# Patient Record
Sex: FEMALE | Race: WHITE | Employment: OTHER | URBAN - METROPOLITAN AREA
[De-identification: names, ages, dates, MRNs, and addresses within clinical notes are randomized per-mention and may not be internally consistent; named-entity substitution may affect disease eponyms.]

---

## 2017-07-19 ENCOUNTER — HOSPITAL ENCOUNTER (OUTPATIENT)
Dept: GENERAL RADIOLOGY | Age: 77
Discharge: HOME | End: 2017-07-19
Attending: FAMILY MEDICINE | Admitting: FAMILY MEDICINE

## 2017-07-19 LAB
ALBUMIN SERPL-MCNC: 4.4 GM/DL (ref 3.4–5)
ALP BLD-CCNC: 66 IU/L (ref 40–128)
ALT SERPL-CCNC: 23 U/L (ref 10–40)
ANION GAP SERPL CALCULATED.3IONS-SCNC: 11 MMOL/L (ref 4–16)
AST SERPL-CCNC: 32 IU/L (ref 15–37)
BILIRUB SERPL-MCNC: 0.4 MG/DL (ref 0–1)
BUN BLDV-MCNC: 12 MG/DL (ref 6–23)
CALCIUM SERPL-MCNC: 9.7 MG/DL (ref 8.3–10.6)
CHLORIDE BLD-SCNC: 105 MMOL/L (ref 99–110)
CHOLESTEROL: 207 MG/DL
CO2: 28 MMOL/L (ref 21–32)
CREAT SERPL-MCNC: 0.9 MG/DL (ref 0.6–1.1)
GFR AFRICAN AMERICAN: >60 ML/MIN/1.73M2
GFR NON-AFRICAN AMERICAN: >60 ML/MIN/1.73M2
GLUCOSE BLD-MCNC: 95 MG/DL (ref 70–140)
HDLC SERPL-MCNC: 54 MG/DL
LDL CHOLESTEROL DIRECT: 140 MG/DL
POTASSIUM SERPL-SCNC: 4.9 MMOL/L (ref 3.5–5.1)
SODIUM BLD-SCNC: 144 MMOL/L (ref 135–145)
TOTAL PROTEIN: 6.5 GM/DL (ref 6.4–8.2)
TRIGL SERPL-MCNC: 210 MG/DL

## 2017-12-05 ENCOUNTER — HOSPITAL ENCOUNTER (OUTPATIENT)
Dept: GENERAL RADIOLOGY | Age: 77
Discharge: HOME | End: 2017-12-05
Attending: FAMILY MEDICINE | Admitting: FAMILY MEDICINE

## 2017-12-05 LAB
ALBUMIN SERPL-MCNC: 4 GM/DL (ref 3.4–5)
ALP BLD-CCNC: 58 IU/L (ref 40–128)
ALT SERPL-CCNC: 20 U/L (ref 10–40)
ANION GAP SERPL CALCULATED.3IONS-SCNC: 12 MMOL/L (ref 4–16)
AST SERPL-CCNC: 30 IU/L (ref 15–37)
BILIRUB SERPL-MCNC: 0.6 MG/DL (ref 0–1)
BUN BLDV-MCNC: 14 MG/DL (ref 6–23)
CALCIUM SERPL-MCNC: 9.4 MG/DL (ref 8.3–10.6)
CHLORIDE BLD-SCNC: 100 MMOL/L (ref 99–110)
CHOLESTEROL: 206 MG/DL
CO2: 27 MMOL/L (ref 21–32)
CREAT SERPL-MCNC: 0.9 MG/DL (ref 0.6–1.1)
ESTIMATED AVERAGE GLUCOSE: 114 MG/DL
GFR AFRICAN AMERICAN: >60 ML/MIN/1.73M2
GFR NON-AFRICAN AMERICAN: >60 ML/MIN/1.73M2
GLUCOSE FASTING: 93 MG/DL (ref 70–99)
HBA1C MFR BLD: 5.6 % (ref 4.2–6.3)
HDLC SERPL-MCNC: 48 MG/DL
LDL CHOLESTEROL DIRECT: 128 MG/DL
POTASSIUM SERPL-SCNC: 4.2 MMOL/L (ref 3.5–5.1)
SODIUM BLD-SCNC: 139 MMOL/L (ref 135–145)
TOTAL PROTEIN: 6.2 GM/DL (ref 6.4–8.2)
TRIGL SERPL-MCNC: 225 MG/DL

## 2017-12-22 ENCOUNTER — HOSPITAL ENCOUNTER (OUTPATIENT)
Dept: GENERAL RADIOLOGY | Age: 77
Discharge: HOME | End: 2017-12-22
Attending: FAMILY MEDICINE | Admitting: FAMILY MEDICINE

## 2017-12-22 DIAGNOSIS — N39.3 FEMALE STRESS INCONTINENCE: ICD-10-CM

## 2018-05-18 ENCOUNTER — HOSPITAL ENCOUNTER (OUTPATIENT)
Dept: PHYSICAL THERAPY | Age: 78
Discharge: HOME | End: 2018-05-31
Attending: ORTHOPAEDIC SURGERY | Admitting: ORTHOPAEDIC SURGERY

## 2018-05-18 ASSESSMENT — PAIN DESCRIPTION - ORIENTATION: ORIENTATION: RIGHT

## 2018-05-18 ASSESSMENT — PAIN DESCRIPTION - PAIN TYPE: TYPE: ACUTE PAIN

## 2018-05-18 ASSESSMENT — PAIN DESCRIPTION - LOCATION: LOCATION: SHOULDER

## 2018-05-18 ASSESSMENT — PAIN DESCRIPTION - DESCRIPTORS: DESCRIPTORS: THROBBING;ACHING

## 2018-05-18 ASSESSMENT — PAIN DESCRIPTION - FREQUENCY: FREQUENCY: INTERMITTENT

## 2018-05-18 ASSESSMENT — PAIN SCALES - GENERAL: PAINLEVEL_OUTOF10: 5

## 2018-05-18 ASSESSMENT — PAIN DESCRIPTION - PROGRESSION: CLINICAL_PROGRESSION: GRADUALLY IMPROVING

## 2018-05-23 ENCOUNTER — HOSPITAL ENCOUNTER (OUTPATIENT)
Dept: PHYSICAL THERAPY | Age: 78
Discharge: HOME | End: 2018-05-23
Admitting: ORTHOPAEDIC SURGERY

## 2018-05-25 ENCOUNTER — HOSPITAL ENCOUNTER (OUTPATIENT)
Dept: PHYSICAL THERAPY | Age: 78
Discharge: HOME | End: 2018-05-25
Admitting: ORTHOPAEDIC SURGERY

## 2018-05-30 ENCOUNTER — HOSPITAL ENCOUNTER (OUTPATIENT)
Dept: PHYSICAL THERAPY | Age: 78
Discharge: HOME | End: 2018-05-30
Admitting: ORTHOPAEDIC SURGERY

## 2018-06-01 ENCOUNTER — HOSPITAL ENCOUNTER (OUTPATIENT)
Dept: GENERAL RADIOLOGY | Age: 78
Discharge: HOME | End: 2018-06-01
Attending: FAMILY MEDICINE | Admitting: FAMILY MEDICINE

## 2018-06-01 ENCOUNTER — HOSPITAL ENCOUNTER (OUTPATIENT)
Dept: OTHER | Age: 78
Discharge: HOME | End: 2018-06-30
Attending: ORTHOPAEDIC SURGERY | Admitting: ORTHOPAEDIC SURGERY

## 2018-06-01 ENCOUNTER — HOSPITAL ENCOUNTER (OUTPATIENT)
Dept: PHYSICAL THERAPY | Age: 78
Discharge: HOME | End: 2018-06-01
Admitting: ORTHOPAEDIC SURGERY

## 2018-06-01 LAB
ALBUMIN SERPL-MCNC: 4.5 GM/DL (ref 3.4–5)
ALP BLD-CCNC: 84 IU/L (ref 40–129)
ALT SERPL-CCNC: 21 U/L (ref 10–40)
AST SERPL-CCNC: 35 IU/L (ref 15–37)
BACTERIA: ABNORMAL /HPF
BILIRUB SERPL-MCNC: 0.6 MG/DL (ref 0–1)
BILIRUBIN DIRECT: 0.2 MG/DL (ref 0–0.3)
BILIRUBIN URINE: NEGATIVE MG/DL
BILIRUBIN, INDIRECT: 0.4 MG/DL (ref 0–0.7)
BLOOD, URINE: NEGATIVE
CHOLESTEROL: 209 MG/DL
CLARITY: ABNORMAL
COLOR: YELLOW
GLUCOSE, URINE: NEGATIVE MG/DL
HDLC SERPL-MCNC: 49 MG/DL
KETONES, URINE: NEGATIVE MG/DL
LDL CHOLESTEROL DIRECT: 141 MG/DL
LEUKOCYTE ESTERASE, URINE: NEGATIVE
MUCUS: ABNORMAL HPF
NITRITE URINE, QUANTITATIVE: NEGATIVE
PH, URINE: 5 (ref 5–8)
PROTEIN UA: NEGATIVE MG/DL
RBC URINE: ABNORMAL /HPF (ref 0–6)
SPECIFIC GRAVITY UA: 1.02 (ref 1–1.03)
SQUAMOUS EPITHELIAL: 13 /HPF
TOTAL PROTEIN: 6.6 GM/DL (ref 6.4–8.2)
TRICHOMONAS: ABNORMAL /HPF
TRIGL SERPL-MCNC: 244 MG/DL
UROBILINOGEN, URINE: NORMAL MG/DL (ref 0.2–1)
WBC UA: <1 /HPF (ref 0–5)

## 2018-06-05 ENCOUNTER — HOSPITAL ENCOUNTER (OUTPATIENT)
Dept: PHYSICAL THERAPY | Age: 78
Discharge: HOME | End: 2018-06-05
Admitting: ORTHOPAEDIC SURGERY

## 2018-06-07 ENCOUNTER — HOSPITAL ENCOUNTER (OUTPATIENT)
Dept: PHYSICAL THERAPY | Age: 78
Discharge: HOME | End: 2018-06-07
Admitting: ORTHOPAEDIC SURGERY

## 2018-06-28 ENCOUNTER — HOSPITAL ENCOUNTER (OUTPATIENT)
Dept: GENERAL RADIOLOGY | Age: 78
Discharge: HOME | End: 2018-06-28
Attending: FAMILY MEDICINE | Admitting: FAMILY MEDICINE

## 2018-06-28 LAB
BASOPHILS ABSOLUTE: 0.1 K/CU MM
BASOPHILS RELATIVE PERCENT: 1.6 % (ref 0–1)
DIFFERENTIAL TYPE: ABNORMAL
EOSINOPHILS ABSOLUTE: 0.2 K/CU MM
EOSINOPHILS RELATIVE PERCENT: 3.5 % (ref 0–3)
HCT VFR BLD CALC: 45.8 % (ref 37–47)
HEMOGLOBIN: 14.7 GM/DL (ref 12.5–16)
IMMATURE NEUTROPHIL %: 0.4 % (ref 0–0.43)
LYMPHOCYTES ABSOLUTE: 2 K/CU MM
LYMPHOCYTES RELATIVE PERCENT: 36 % (ref 24–44)
MCH RBC QN AUTO: 31.2 PG (ref 27–31)
MCHC RBC AUTO-ENTMCNC: 32.1 % (ref 32–36)
MCV RBC AUTO: 97.2 FL (ref 78–100)
MONOCYTES ABSOLUTE: 0.4 K/CU MM
MONOCYTES RELATIVE PERCENT: 6.9 % (ref 0–4)
NUCLEATED RBC %: 0 %
PDW BLD-RTO: 12.2 % (ref 11.7–14.9)
PLATELET # BLD: 201 K/CU MM (ref 140–440)
PMV BLD AUTO: 11.1 FL (ref 7.5–11.1)
RBC # BLD: 4.71 M/CU MM (ref 4.2–5.4)
SEGMENTED NEUTROPHILS ABSOLUTE COUNT: 2.9 K/CU MM
SEGMENTED NEUTROPHILS RELATIVE PERCENT: 51.6 % (ref 36–66)
T3 FREE: 3 PG/ML (ref 2.3–4.2)
T4 FREE: 1.08 NG/DL (ref 0.9–1.8)
TOTAL IMMATURE NEUTOROPHIL: 0.02 K/CU MM
TOTAL NUCLEATED RBC: 0 K/CU MM
TSH HIGH SENSITIVITY: 4.25 UIU/ML (ref 0.27–4.2)
WBC # BLD: 5.7 K/CU MM (ref 4–10.5)

## 2018-07-01 ENCOUNTER — HOSPITAL ENCOUNTER (OUTPATIENT)
Dept: OTHER | Age: 78
Discharge: HOME | End: 2018-07-19
Attending: ORTHOPAEDIC SURGERY | Admitting: ORTHOPAEDIC SURGERY

## 2018-09-11 ENCOUNTER — HOSPITAL ENCOUNTER (OUTPATIENT)
Dept: GENERAL RADIOLOGY | Age: 78
Discharge: HOME | End: 2018-09-11
Attending: FAMILY MEDICINE | Admitting: FAMILY MEDICINE

## 2018-09-11 LAB
ALBUMIN SERPL-MCNC: 4.2 GM/DL (ref 3.4–5)
ALP BLD-CCNC: 69 IU/L (ref 40–128)
ALT SERPL-CCNC: 21 U/L (ref 10–40)
ANION GAP SERPL CALCULATED.3IONS-SCNC: 10 MMOL/L (ref 4–16)
AST SERPL-CCNC: 32 IU/L (ref 15–37)
BILIRUB SERPL-MCNC: 0.6 MG/DL (ref 0–1)
BUN BLDV-MCNC: 11 MG/DL (ref 6–23)
CALCIUM SERPL-MCNC: 9.6 MG/DL (ref 8.3–10.6)
CHLORIDE BLD-SCNC: 104 MMOL/L (ref 99–110)
CHOLESTEROL: 223 MG/DL
CO2: 28 MMOL/L (ref 21–32)
CREAT SERPL-MCNC: 0.9 MG/DL (ref 0.6–1.1)
ESTIMATED AVERAGE GLUCOSE: 117 MG/DL
GFR AFRICAN AMERICAN: >60 ML/MIN/1.73M2
GFR NON-AFRICAN AMERICAN: >60 ML/MIN/1.73M2
GLUCOSE FASTING: 97 MG/DL (ref 70–99)
HBA1C MFR BLD: 5.7 % (ref 4.2–6.3)
HDLC SERPL-MCNC: 50 MG/DL
LDL CHOLESTEROL DIRECT: 147 MG/DL
POTASSIUM SERPL-SCNC: 4.8 MMOL/L (ref 3.5–5.1)
SODIUM BLD-SCNC: 142 MMOL/L (ref 135–145)
TOTAL PROTEIN: 6.1 GM/DL (ref 6.4–8.2)
TRIGL SERPL-MCNC: 234 MG/DL
URIC ACID: 5.4 MG/DL (ref 2.6–6)

## 2018-10-17 ENCOUNTER — HOSPITAL ENCOUNTER (OUTPATIENT)
Dept: ULTRASOUND IMAGING | Age: 78
Discharge: HOME | End: 2018-10-17
Payer: MEDICARE

## 2018-10-17 DIAGNOSIS — M79.604 PAIN IN BOTH LOWER EXTREMITIES: ICD-10-CM

## 2018-10-17 DIAGNOSIS — M79.605 PAIN IN BOTH LOWER EXTREMITIES: ICD-10-CM

## 2018-10-17 PROCEDURE — 93925 LOWER EXTREMITY STUDY: CPT

## 2018-12-05 ENCOUNTER — HOSPITAL ENCOUNTER (OUTPATIENT)
Age: 78
Discharge: HOME | End: 2018-12-05
Payer: MEDICARE

## 2018-12-05 LAB
ALBUMIN SERPL-MCNC: 4.3 GM/DL (ref 3.4–5)
ALP BLD-CCNC: 110 IU/L (ref 40–128)
ALT SERPL-CCNC: 26 U/L (ref 10–40)
ANION GAP SERPL CALCULATED.3IONS-SCNC: 14 MMOL/L (ref 4–16)
AST SERPL-CCNC: 40 IU/L (ref 15–37)
BILIRUB SERPL-MCNC: 0.6 MG/DL (ref 0–1)
BUN BLDV-MCNC: 12 MG/DL (ref 6–23)
CALCIUM SERPL-MCNC: 10 MG/DL (ref 8.3–10.6)
CHLORIDE BLD-SCNC: 102 MMOL/L (ref 99–110)
CHOLESTEROL: 207 MG/DL
CO2: 27 MMOL/L (ref 21–32)
CREAT SERPL-MCNC: 0.9 MG/DL (ref 0.6–1.1)
GFR AFRICAN AMERICAN: >60 ML/MIN/1.73M2
GFR NON-AFRICAN AMERICAN: >60 ML/MIN/1.73M2
GLUCOSE BLD-MCNC: 97 MG/DL (ref 70–99)
HDLC SERPL-MCNC: 50 MG/DL
LDL CHOLESTEROL DIRECT: 132 MG/DL
POTASSIUM SERPL-SCNC: 4.8 MMOL/L (ref 3.5–5.1)
SODIUM BLD-SCNC: 143 MMOL/L (ref 135–145)
TOTAL PROTEIN: 6.6 GM/DL (ref 6.4–8.2)
TRIGL SERPL-MCNC: 232 MG/DL

## 2018-12-05 PROCEDURE — 83721 ASSAY OF BLOOD LIPOPROTEIN: CPT

## 2018-12-05 PROCEDURE — 80061 LIPID PANEL: CPT

## 2018-12-05 PROCEDURE — 36415 COLL VENOUS BLD VENIPUNCTURE: CPT

## 2018-12-05 PROCEDURE — 80053 COMPREHEN METABOLIC PANEL: CPT

## 2020-08-28 ENCOUNTER — HOSPITAL ENCOUNTER (OUTPATIENT)
Age: 80
Discharge: HOME | End: 2020-08-28
Payer: MEDICARE

## 2020-08-28 PROCEDURE — U0002 COVID-19 LAB TEST NON-CDC: HCPCS

## 2020-08-30 LAB
SARS-COV-2: NOT DETECTED
SOURCE: NORMAL

## 2022-06-20 ENCOUNTER — HOSPITAL ENCOUNTER (INPATIENT)
Age: 82
LOS: 2 days | Discharge: HOME | DRG: 310 | End: 2022-06-22
Attending: EMERGENCY MEDICINE | Admitting: INTERNAL MEDICINE
Payer: MEDICARE

## 2022-06-20 ENCOUNTER — APPOINTMENT (OUTPATIENT)
Dept: GENERAL RADIOLOGY | Age: 82
DRG: 310 | End: 2022-06-20
Payer: MEDICARE

## 2022-06-20 ENCOUNTER — NURSE ONLY (OUTPATIENT)
Dept: CARDIOLOGY CLINIC | Age: 82
End: 2022-06-20
Payer: MEDICARE

## 2022-06-20 DIAGNOSIS — R07.9 CHEST PAIN, UNSPECIFIED TYPE: Primary | ICD-10-CM

## 2022-06-20 DIAGNOSIS — I48.91 ATRIAL FIBRILLATION WITH RVR (HCC): Primary | ICD-10-CM

## 2022-06-20 DIAGNOSIS — I47.1 SVT (SUPRAVENTRICULAR TACHYCARDIA) (HCC): ICD-10-CM

## 2022-06-20 LAB
ALBUMIN SERPL-MCNC: 4 GM/DL (ref 3.4–5)
ALP BLD-CCNC: 75 IU/L (ref 40–129)
ALT SERPL-CCNC: 12 U/L (ref 10–40)
ANION GAP SERPL CALCULATED.3IONS-SCNC: 9 MMOL/L (ref 4–16)
APTT: 29.2 SECONDS (ref 25.1–37.1)
AST SERPL-CCNC: 23 IU/L (ref 15–37)
BACTERIA: NEGATIVE /HPF
BASOPHILS ABSOLUTE: 0.1 K/CU MM
BASOPHILS RELATIVE PERCENT: 1.3 % (ref 0–1)
BILIRUB SERPL-MCNC: 0.2 MG/DL (ref 0–1)
BILIRUBIN URINE: NEGATIVE MG/DL
BLOOD, URINE: NEGATIVE
BUN BLDV-MCNC: 15 MG/DL (ref 6–23)
CALCIUM SERPL-MCNC: 9.2 MG/DL (ref 8.3–10.6)
CHLORIDE BLD-SCNC: 103 MMOL/L (ref 99–110)
CLARITY: CLEAR
CO2: 27 MMOL/L (ref 21–32)
COLOR: YELLOW
CREAT SERPL-MCNC: 0.9 MG/DL (ref 0.6–1.1)
DIFFERENTIAL TYPE: ABNORMAL
EOSINOPHILS ABSOLUTE: 0.1 K/CU MM
EOSINOPHILS RELATIVE PERCENT: 1.6 % (ref 0–3)
GFR AFRICAN AMERICAN: >60 ML/MIN/1.73M2
GFR NON-AFRICAN AMERICAN: 60 ML/MIN/1.73M2
GLUCOSE BLD-MCNC: 100 MG/DL (ref 70–99)
GLUCOSE, URINE: NEGATIVE MG/DL
HCT VFR BLD CALC: 40 % (ref 37–47)
HEMOGLOBIN: 12 GM/DL (ref 12.5–16)
IMMATURE NEUTROPHIL %: 0.3 % (ref 0–0.43)
INR BLD: 1.05 INDEX
KETONES, URINE: ABNORMAL MG/DL
LACTATE: 1.5 MMOL/L (ref 0.4–2)
LEUKOCYTE ESTERASE, URINE: ABNORMAL
LIPASE: 30 IU/L (ref 13–60)
LYMPHOCYTES ABSOLUTE: 2.1 K/CU MM
LYMPHOCYTES RELATIVE PERCENT: 29.1 % (ref 24–44)
MAGNESIUM: 1.9 MG/DL (ref 1.8–2.4)
MCH RBC QN AUTO: 27.3 PG (ref 27–31)
MCHC RBC AUTO-ENTMCNC: 30 % (ref 32–36)
MCV RBC AUTO: 90.9 FL (ref 78–100)
MONOCYTES ABSOLUTE: 0.5 K/CU MM
MONOCYTES RELATIVE PERCENT: 7.3 % (ref 0–4)
MUCUS: ABNORMAL HPF
NITRITE URINE, QUANTITATIVE: NEGATIVE
NON SQUAM EPI CELLS: <1 /HPF
NUCLEATED RBC %: 0 %
PDW BLD-RTO: 14.1 % (ref 11.7–14.9)
PH, URINE: 5.5 (ref 5–8)
PLATELET # BLD: 317 K/CU MM (ref 140–440)
PMV BLD AUTO: 10.5 FL (ref 7.5–11.1)
POTASSIUM SERPL-SCNC: 4.5 MMOL/L (ref 3.5–5.1)
PRO-BNP: 2630 PG/ML
PROTEIN UA: NEGATIVE MG/DL
PROTHROMBIN TIME: 13.5 SECONDS (ref 11.7–14.5)
RBC # BLD: 4.4 M/CU MM (ref 4.2–5.4)
RBC URINE: ABNORMAL /HPF (ref 0–6)
SEGMENTED NEUTROPHILS ABSOLUTE COUNT: 4.3 K/CU MM
SEGMENTED NEUTROPHILS RELATIVE PERCENT: 60.4 % (ref 36–66)
SODIUM BLD-SCNC: 139 MMOL/L (ref 135–145)
SPECIFIC GRAVITY UA: >1.03 (ref 1–1.03)
SQUAMOUS EPITHELIAL: 2 /HPF
T4 FREE: 1.09 NG/DL (ref 0.9–1.8)
TOTAL IMMATURE NEUTOROPHIL: 0.02 K/CU MM
TOTAL NUCLEATED RBC: 0 K/CU MM
TOTAL PROTEIN: 6.1 GM/DL (ref 6.4–8.2)
TRICHOMONAS: ABNORMAL /HPF
TROPONIN T: <0.01 NG/ML
TROPONIN T: <0.01 NG/ML
TSH HIGH SENSITIVITY: 2.19 UIU/ML (ref 0.27–4.2)
UROBILINOGEN, URINE: NORMAL MG/DL (ref 0.2–1)
WBC # BLD: 7.1 K/CU MM (ref 4–10.5)
WBC UA: 2 /HPF (ref 0–5)

## 2022-06-20 PROCEDURE — 80053 COMPREHEN METABOLIC PANEL: CPT

## 2022-06-20 PROCEDURE — 96365 THER/PROPH/DIAG IV INF INIT: CPT

## 2022-06-20 PROCEDURE — 6370000000 HC RX 637 (ALT 250 FOR IP): Performed by: NURSE PRACTITIONER

## 2022-06-20 PROCEDURE — 84443 ASSAY THYROID STIM HORMONE: CPT

## 2022-06-20 PROCEDURE — 6370000000 HC RX 637 (ALT 250 FOR IP): Performed by: INTERNAL MEDICINE

## 2022-06-20 PROCEDURE — 2140000000 HC CCU INTERMEDIATE R&B

## 2022-06-20 PROCEDURE — 84439 ASSAY OF FREE THYROXINE: CPT

## 2022-06-20 PROCEDURE — 83880 ASSAY OF NATRIURETIC PEPTIDE: CPT

## 2022-06-20 PROCEDURE — 84484 ASSAY OF TROPONIN QUANT: CPT

## 2022-06-20 PROCEDURE — 83690 ASSAY OF LIPASE: CPT

## 2022-06-20 PROCEDURE — 2580000003 HC RX 258: Performed by: EMERGENCY MEDICINE

## 2022-06-20 PROCEDURE — 85610 PROTHROMBIN TIME: CPT

## 2022-06-20 PROCEDURE — 81001 URINALYSIS AUTO W/SCOPE: CPT

## 2022-06-20 PROCEDURE — 2580000003 HC RX 258: Performed by: INTERNAL MEDICINE

## 2022-06-20 PROCEDURE — 96376 TX/PRO/DX INJ SAME DRUG ADON: CPT

## 2022-06-20 PROCEDURE — 85730 THROMBOPLASTIN TIME PARTIAL: CPT

## 2022-06-20 PROCEDURE — 96366 THER/PROPH/DIAG IV INF ADDON: CPT

## 2022-06-20 PROCEDURE — 83735 ASSAY OF MAGNESIUM: CPT

## 2022-06-20 PROCEDURE — 93005 ELECTROCARDIOGRAM TRACING: CPT | Performed by: INTERNAL MEDICINE

## 2022-06-20 PROCEDURE — 93000 ELECTROCARDIOGRAM COMPLETE: CPT | Performed by: NURSE PRACTITIONER

## 2022-06-20 PROCEDURE — 6370000000 HC RX 637 (ALT 250 FOR IP): Performed by: EMERGENCY MEDICINE

## 2022-06-20 PROCEDURE — 2500000003 HC RX 250 WO HCPCS: Performed by: EMERGENCY MEDICINE

## 2022-06-20 PROCEDURE — 85025 COMPLETE CBC W/AUTO DIFF WBC: CPT

## 2022-06-20 PROCEDURE — 93005 ELECTROCARDIOGRAM TRACING: CPT | Performed by: EMERGENCY MEDICINE

## 2022-06-20 PROCEDURE — 99285 EMERGENCY DEPT VISIT HI MDM: CPT

## 2022-06-20 PROCEDURE — 83605 ASSAY OF LACTIC ACID: CPT

## 2022-06-20 PROCEDURE — 71045 X-RAY EXAM CHEST 1 VIEW: CPT

## 2022-06-20 RX ORDER — KETOTIFEN FUMARATE 0.35 MG/ML
1 SOLUTION/ DROPS OPHTHALMIC 2 TIMES DAILY
Status: CANCELLED | OUTPATIENT
Start: 2022-06-20

## 2022-06-20 RX ORDER — 0.9 % SODIUM CHLORIDE 0.9 %
1000 INTRAVENOUS SOLUTION INTRAVENOUS ONCE
Status: COMPLETED | OUTPATIENT
Start: 2022-06-20 | End: 2022-06-20

## 2022-06-20 RX ORDER — ASPIRIN 81 MG/1
324 TABLET, CHEWABLE ORAL ONCE
Status: COMPLETED | OUTPATIENT
Start: 2022-06-20 | End: 2022-06-20

## 2022-06-20 RX ORDER — POLYETHYLENE GLYCOL 3350 17 G/17G
17 POWDER, FOR SOLUTION ORAL DAILY PRN
Status: DISCONTINUED | OUTPATIENT
Start: 2022-06-20 | End: 2022-06-22 | Stop reason: HOSPADM

## 2022-06-20 RX ORDER — ONDANSETRON 2 MG/ML
4 INJECTION INTRAMUSCULAR; INTRAVENOUS EVERY 6 HOURS PRN
Status: DISCONTINUED | OUTPATIENT
Start: 2022-06-20 | End: 2022-06-22 | Stop reason: HOSPADM

## 2022-06-20 RX ORDER — SODIUM CHLORIDE 0.9 % (FLUSH) 0.9 %
5-40 SYRINGE (ML) INJECTION PRN
Status: DISCONTINUED | OUTPATIENT
Start: 2022-06-20 | End: 2022-06-22 | Stop reason: HOSPADM

## 2022-06-20 RX ORDER — LANOLIN ALCOHOL/MO/W.PET/CERES
500 CREAM (GRAM) TOPICAL DAILY
Status: DISCONTINUED | OUTPATIENT
Start: 2022-06-21 | End: 2022-06-22 | Stop reason: HOSPADM

## 2022-06-20 RX ORDER — CALCIUM CARBONATE 500(1250)
500 TABLET ORAL DAILY
Status: DISCONTINUED | OUTPATIENT
Start: 2022-06-21 | End: 2022-06-22 | Stop reason: HOSPADM

## 2022-06-20 RX ORDER — ACETAMINOPHEN 650 MG/1
650 SUPPOSITORY RECTAL EVERY 6 HOURS PRN
Status: DISCONTINUED | OUTPATIENT
Start: 2022-06-20 | End: 2022-06-22 | Stop reason: HOSPADM

## 2022-06-20 RX ORDER — SODIUM CHLORIDE 9 MG/ML
INJECTION, SOLUTION INTRAVENOUS CONTINUOUS
Status: DISCONTINUED | OUTPATIENT
Start: 2022-06-20 | End: 2022-06-22

## 2022-06-20 RX ORDER — ONDANSETRON 4 MG/1
4 TABLET, ORALLY DISINTEGRATING ORAL EVERY 8 HOURS PRN
Status: DISCONTINUED | OUTPATIENT
Start: 2022-06-20 | End: 2022-06-22 | Stop reason: HOSPADM

## 2022-06-20 RX ORDER — PANTOPRAZOLE SODIUM 40 MG/1
40 TABLET, DELAYED RELEASE ORAL
Status: DISCONTINUED | OUTPATIENT
Start: 2022-06-21 | End: 2022-06-22 | Stop reason: HOSPADM

## 2022-06-20 RX ORDER — METOPROLOL TARTRATE 50 MG/1
50 TABLET, FILM COATED ORAL 2 TIMES DAILY
Status: DISCONTINUED | OUTPATIENT
Start: 2022-06-20 | End: 2022-06-22

## 2022-06-20 RX ORDER — ACETAMINOPHEN 325 MG/1
650 TABLET ORAL EVERY 6 HOURS PRN
Status: DISCONTINUED | OUTPATIENT
Start: 2022-06-20 | End: 2022-06-22 | Stop reason: HOSPADM

## 2022-06-20 RX ORDER — DILTIAZEM HYDROCHLORIDE 5 MG/ML
10 INJECTION INTRAVENOUS ONCE
Status: COMPLETED | OUTPATIENT
Start: 2022-06-20 | End: 2022-06-20

## 2022-06-20 RX ORDER — SODIUM CHLORIDE 0.9 % (FLUSH) 0.9 %
5-40 SYRINGE (ML) INJECTION 2 TIMES DAILY
Status: DISCONTINUED | OUTPATIENT
Start: 2022-06-21 | End: 2022-06-22 | Stop reason: HOSPADM

## 2022-06-20 RX ORDER — SODIUM CHLORIDE 9 MG/ML
INJECTION, SOLUTION INTRAVENOUS PRN
Status: DISCONTINUED | OUTPATIENT
Start: 2022-06-20 | End: 2022-06-22 | Stop reason: HOSPADM

## 2022-06-20 RX ADMIN — SODIUM CHLORIDE 1000 ML: 9 INJECTION, SOLUTION INTRAVENOUS at 18:15

## 2022-06-20 RX ADMIN — APIXABAN 5 MG: 5 TABLET, FILM COATED ORAL at 23:33

## 2022-06-20 RX ADMIN — SODIUM CHLORIDE: 9 INJECTION, SOLUTION INTRAVENOUS at 19:52

## 2022-06-20 RX ADMIN — METOPROLOL TARTRATE 50 MG: 50 TABLET, FILM COATED ORAL at 21:43

## 2022-06-20 RX ADMIN — ASPIRIN 81 MG CHEWABLE TABLET 324 MG: 81 TABLET CHEWABLE at 18:15

## 2022-06-20 RX ADMIN — DILTIAZEM HYDROCHLORIDE 10 MG: 5 INJECTION INTRAVENOUS at 18:15

## 2022-06-20 RX ADMIN — DEXTROSE MONOHYDRATE 5 MG/HR: 50 INJECTION, SOLUTION INTRAVENOUS at 19:53

## 2022-06-20 ASSESSMENT — ENCOUNTER SYMPTOMS
SHORTNESS OF BREATH: 0
VOMITING: 0
CHEST TIGHTNESS: 0
ABDOMINAL PAIN: 0
COUGH: 0
NAUSEA: 0
CONSTIPATION: 1
BLOOD IN STOOL: 0

## 2022-06-20 NOTE — ED NOTES
Son, Daisy Salazar @ [phone], would like updates via phone      Shaggy Aguilar RN  06/20/22 [id number]

## 2022-06-20 NOTE — CONSULTS
Patient follows in office   She is in with rapid rate-she has hx of afib   She stopped her meds for dental procedure  Will follow   Start on cardizem drip rate control  Check labs    Echo shows normal EF 57%-5/21  Stress test 5/21-negative for ischemia -normal /EF   Start her back on her lopressor for now       Electronically signed by Eugenia Rosario MD on 6/20/22 at 7:25 PM EDT    Patient was seen last 2/23/22  Hx of PAFIB  She is on lopressor 25 bid and eliquis 5 bid at home  She was having black stool   Await lab results    Electronically signed by Eugenia Rosario MD on 6/20/22 at 7:50 PM EDT

## 2022-06-21 LAB
EKG ATRIAL RATE: 156 BPM
EKG ATRIAL RATE: 326 BPM
EKG ATRIAL RATE: 62 BPM
EKG DIAGNOSIS: NORMAL
EKG P AXIS: 29 DEGREES
EKG P-R INTERVAL: 168 MS
EKG Q-T INTERVAL: 280 MS
EKG Q-T INTERVAL: 332 MS
EKG Q-T INTERVAL: 416 MS
EKG QRS DURATION: 68 MS
EKG QRS DURATION: 72 MS
EKG QRS DURATION: 80 MS
EKG QTC CALCULATION (BAZETT): 412 MS
EKG QTC CALCULATION (BAZETT): 422 MS
EKG QTC CALCULATION (BAZETT): 449 MS
EKG R AXIS: -18 DEGREES
EKG R AXIS: -23 DEGREES
EKG R AXIS: -25 DEGREES
EKG T AXIS: 156 DEGREES
EKG T AXIS: 159 DEGREES
EKG T AXIS: 91 DEGREES
EKG VENTRICULAR RATE: 155 BPM
EKG VENTRICULAR RATE: 62 BPM
EKG VENTRICULAR RATE: 93 BPM
TROPONIN T: <0.01 NG/ML

## 2022-06-21 PROCEDURE — 6370000000 HC RX 637 (ALT 250 FOR IP): Performed by: INTERNAL MEDICINE

## 2022-06-21 PROCEDURE — 93010 ELECTROCARDIOGRAM REPORT: CPT | Performed by: INTERNAL MEDICINE

## 2022-06-21 PROCEDURE — 7100000001 HC PACU RECOVERY - ADDTL 15 MIN

## 2022-06-21 PROCEDURE — 6370000000 HC RX 637 (ALT 250 FOR IP): Performed by: NURSE PRACTITIONER

## 2022-06-21 PROCEDURE — 2580000003 HC RX 258: Performed by: NURSE PRACTITIONER

## 2022-06-21 PROCEDURE — 5A2204Z RESTORATION OF CARDIAC RHYTHM, SINGLE: ICD-10-PCS | Performed by: INTERNAL MEDICINE

## 2022-06-21 PROCEDURE — 92960 CARDIOVERSION ELECTRIC EXT: CPT

## 2022-06-21 PROCEDURE — 36415 COLL VENOUS BLD VENIPUNCTURE: CPT

## 2022-06-21 PROCEDURE — 6360000002 HC RX W HCPCS: Performed by: INTERNAL MEDICINE

## 2022-06-21 PROCEDURE — 93306 TTE W/DOPPLER COMPLETE: CPT

## 2022-06-21 PROCEDURE — 7100000000 HC PACU RECOVERY - FIRST 15 MIN

## 2022-06-21 PROCEDURE — B24BZZ4 ULTRASONOGRAPHY OF HEART WITH AORTA, TRANSESOPHAGEAL: ICD-10-PCS | Performed by: INTERNAL MEDICINE

## 2022-06-21 PROCEDURE — 93312 ECHO TRANSESOPHAGEAL: CPT

## 2022-06-21 PROCEDURE — 2580000003 HC RX 258: Performed by: INTERNAL MEDICINE

## 2022-06-21 PROCEDURE — 84484 ASSAY OF TROPONIN QUANT: CPT

## 2022-06-21 PROCEDURE — 2140000000 HC CCU INTERMEDIATE R&B

## 2022-06-21 RX ORDER — NALOXONE HYDROCHLORIDE 0.4 MG/ML
0.4 INJECTION, SOLUTION INTRAMUSCULAR; INTRAVENOUS; SUBCUTANEOUS PRN
Status: DISCONTINUED | OUTPATIENT
Start: 2022-06-21 | End: 2022-06-22 | Stop reason: HOSPADM

## 2022-06-21 RX ORDER — ALBUTEROL SULFATE 90 UG/1
AEROSOL, METERED RESPIRATORY (INHALATION)
COMMUNITY
Start: 2022-04-18

## 2022-06-21 RX ORDER — MONTELUKAST SODIUM 10 MG/1
TABLET ORAL
COMMUNITY
Start: 2022-04-12

## 2022-06-21 RX ORDER — OMEPRAZOLE 40 MG/1
40 CAPSULE, DELAYED RELEASE ORAL DAILY
COMMUNITY

## 2022-06-21 RX ORDER — ROPINIROLE 1 MG/1
1 TABLET, FILM COATED ORAL NIGHTLY
Status: DISCONTINUED | OUTPATIENT
Start: 2022-06-21 | End: 2022-06-22 | Stop reason: HOSPADM

## 2022-06-21 RX ORDER — ENOXAPARIN SODIUM 100 MG/ML
1 INJECTION SUBCUTANEOUS ONCE
Status: COMPLETED | OUTPATIENT
Start: 2022-06-21 | End: 2022-06-21

## 2022-06-21 RX ADMIN — ROPINIROLE HYDROCHLORIDE 1 MG: 1 TABLET, FILM COATED ORAL at 21:19

## 2022-06-21 RX ADMIN — ACETAMINOPHEN 650 MG: 325 TABLET ORAL at 14:42

## 2022-06-21 RX ADMIN — DEXTROSE MONOHYDRATE 150 MG: 50 INJECTION, SOLUTION INTRAVENOUS at 09:43

## 2022-06-21 RX ADMIN — NALOXONE HYDROCHLORIDE 0.4 MG: 0.4 INJECTION, SOLUTION INTRAMUSCULAR; INTRAVENOUS; SUBCUTANEOUS at 09:40

## 2022-06-21 RX ADMIN — AMIODARONE HYDROCHLORIDE 0.5 MG/MIN: 50 INJECTION, SOLUTION INTRAVENOUS at 14:43

## 2022-06-21 RX ADMIN — SODIUM CHLORIDE, PRESERVATIVE FREE 10 ML: 5 INJECTION INTRAVENOUS at 21:20

## 2022-06-21 RX ADMIN — AMIODARONE HYDROCHLORIDE 0.5 MG/MIN: 50 INJECTION, SOLUTION INTRAVENOUS at 19:55

## 2022-06-21 RX ADMIN — APIXABAN 5 MG: 5 TABLET, FILM COATED ORAL at 21:18

## 2022-06-21 RX ADMIN — METOPROLOL TARTRATE 50 MG: 50 TABLET, FILM COATED ORAL at 21:19

## 2022-06-21 RX ADMIN — DEXTROSE MONOHYDRATE 1 MG/MIN: 50 INJECTION, SOLUTION INTRAVENOUS at 10:17

## 2022-06-21 RX ADMIN — ENOXAPARIN SODIUM 80 MG: 100 INJECTION SUBCUTANEOUS at 08:31

## 2022-06-21 RX ADMIN — SODIUM CHLORIDE: 9 INJECTION, SOLUTION INTRAVENOUS at 06:11

## 2022-06-21 ASSESSMENT — PAIN DESCRIPTION - DESCRIPTORS: DESCRIPTORS: SORE

## 2022-06-21 ASSESSMENT — PAIN SCALES - GENERAL
PAINLEVEL_OUTOF10: 3
PAINLEVEL_OUTOF10: 0

## 2022-06-21 ASSESSMENT — PAIN DESCRIPTION - LOCATION: LOCATION: THROAT

## 2022-06-21 NOTE — ED NOTES
XR CHEST PORTABLE [[id number]]    Status: Final result       Order Providers    Authorizing Billing   DO Yasmine Oneal MD            Signed by    Signed Date/Time Phone Pager   Garima Nguyen 6/20/2022  [phone]:46 [phone]      Reading Providers    Read Date Phone Pager   Karen Nito Jun 20, 2022  6:46 [phone]        XR CHEST PORTABLE: Patient Communication     Not Released  Not seen     Radiation Dose Estimates    No radiation information found for this patient  Narrative   EXAMINATION:   ONE XRAY VIEW OF THE CHEST       6/20/2022 6:14 pm       COMPARISON:   Chest x-ray July 29, 2015       HISTORY:   ORDERING SYSTEM PROVIDED HISTORY: afib   TECHNOLOGIST PROVIDED HISTORY:   Reason for exam:->afib   Reason for Exam: AIFIB   Additional signs and symptoms: NONE   Relevant Medical/Surgical History: gerd, htn       FINDINGS:   Cardiac silhouette appears stable.  The lungs are hypoventilatory.  Chronic   elevation of the right hemidiaphragm.  No focal consolidation, pleural   effusion, or pneumothorax.  Mild atelectasis at the right lung base.  Osseous   structures appear stable.  Unchanged sclerotic focus at the left humeral head   which appears similar to exam from 2015 suggesting benign etiology and   possible enchondroma.           Impression   1.  Right basilar atelectasis, otherwise no acute cardiopulmonary process   identified.              Yuko Shelton RN  06/20/22 [id number]

## 2022-06-21 NOTE — CARE COORDINATION
.CM met with pt for d/c planning. Introduced self and updated white board. Pt lives with her spouse and is independent with ADL's. Pt drives, has a PCP, has insurance, and is able to afford her medication. Pt states that they have a walker and a cane. [facility] offered and pt declined. Pt denies any d/c needs at this time. D/c plan is home with spouse, no needs. Notify CM if any d/c needs arise.   TE

## 2022-06-21 NOTE — ED PROVIDER NOTES
Emergency Department Encounter    Patient: Ta Wilson  MRN: [id number]  : 1940  Date of Evaluation: 2022  ED Provider:  Sade Matute DO    Triage Chief Complaint:   Tachycardia (patient has afib, was at cardiologist for  and they checked her pulse told her to come here)    [place]:  Ta Wilson is a 79 y.o. female that presents to the emergency department complaint of tachycardia. Patient admits to history of atrial fibrillation. Patient states she stopped her blood thinner Eliquis yesterday as well as stopped taking her beta-blocker last evening. She states she is scheduled to have a cavity feel at the dentist tomorrow. Patient states her heart rate yesterday was greater than 100 around 3:00 PM.  She states heart rate usually in the 50s. Patient states she went to her 's appointment with him today he was going to see his cardiologist.  She states she asked the nursing staff to check her heart rate and blood pressure and they noted she had a increased heart rate in the 150s and told her to come to the emergency department for evaluation. Patient states she has had some intermittent shortness of breath but no chest pain denies any nausea vomiting diarrhea no abdominal pain. She states she has had some dizziness and lightheadedness no syncopal episode. She states she has had a little bit of constipation. Patient dates she has had some fatigue since yesterday and felt weak this morning. She denies any swelling extremities no fever chills or cough no headache no vision changes no numbness and tingling in extremities. She states history of atrial fibrillation but no heart stents open heart surgery no congestive heart failure.     ROS - see HPI, below listed is current ROS at time of my eval:  General:  No fevers, no chills, no weakness  Eyes:  No recent vison changes, no discharge  ENT:  No sore throat, no nasal congestion, no hearing changes  Cardiovascular:  No chest pain, positive for palpitations  Respiratory: Positive for shortness of breath, no cough, no wheezing  Gastrointestinal:  No pain, no nausea, no vomiting, no diarrhea  Musculoskeletal:  No muscle pain, no joint pain  Skin:  No rash, no pruritis, no easy bruising  Neurologic:  No speech problems, no headache, no extremity numbness, no extremity tingling, no extremity weakness  Psychiatric:  No anxiety  Genitourinary:  No dysuria, no hematuria  Endocrine:  No unexpected weight gain, no unexpected weight loss  Extremities:  no edema, no pain    Past Medical History:   Diagnosis Date    Basal cell carcinoma of nasal tip 8/2012    EKG abnormality     8/14/2012-incomplete LBBB, PVCs, poor R-wave progression and low QRS voltages noted.  Family history of coronary artery disease     Brother-CABG-age 54    GERD (gastroesophageal reflux disease)     H/O cardiovascular stress test 8/14/2012 8/14/2012-Normal perfusion.  EF 70%    H/O Doppler ultrasound 05/21/15    Arterial Doppler: negative arterial doppler    Hyperlipidemia     diet controlled    Hypertension      Past Surgical History:   Procedure Laterality Date    BREAST LUMPECTOMY Right 1989    CHOLECYSTECTOMY  2010    HYSTERECTOMY (CERVIX STATUS UNKNOWN)  1985    SKIN CANCER EXCISION  8/2012    Excision BCC of nose tip with reconstructon -Dr Jazlyn Whitley     Family History   Problem Relation Age of Onset    Cancer Mother     Heart Disease Father         ?enlarged heart    Cancer Sister     Coronary Art Dis Brother         CABG-age 54     Social History     Socioeconomic History    Marital status:      Spouse name: Not on file    Number of children: 2    Years of education: Not on file    Highest education level: Not on file   Occupational History    Occupation: RETIRED     Comment: [employer]   Tobacco Use    Smoking status: Never Smoker    Smokeless tobacco: Never Used   Substance and Sexual Activity    Alcohol use: No     Comment: CAFFEINE: 2 cups tea, 1 cola daily    Drug use: No    Sexual activity: Yes     Partners: Male     Comment:    Other Topics Concern    Not on file   Social History Narrative    Not on file     Social Determinants of Health     Financial Resource Strain:     Difficulty of Paying Living Expenses: Not on file   Food Insecurity:     Worried About Running Out of Food in the Last Year: Not on file    Vu of Food in the Last Year: Not on file   Transportation Needs:     Lack of Transportation (Medical): Not on file    Lack of Transportation (Non-Medical):  Not on file   Physical Activity:     Days of Exercise per Week: Not on file    Minutes of Exercise per Session: Not on file   Stress:     Feeling of Stress : Not on file   Social Connections:     Frequency of Communication with Friends and Family: Not on file    Frequency of Social Gatherings with Friends and Family: Not on file    Attends [employer]ism Services: Not on file    Active Member of [address] [place] [address] or Organizations: Not on file    Attends Club or Organization Meetings: Not on file    Marital Status: Not on file   Intimate Partner Violence:     Fear of Current or Ex-Partner: Not on file    Emotionally Abused: Not on file    Physically Abused: Not on file    Sexually Abused: Not on file   Housing Stability:     Unable to Pay for Housing in the Last Year: Not on file    Number of [place] in the Last Year: Not on file    Unstable Housing in the Last Year: Not on file     Current Facility-Administered Medications   Medication Dose Route Frequency Provider Last Rate Last Admin    dilTIAZem 100 mg in dextrose 5 % 100 mL infusion ([facility])  2.5-15 mg/hr IntraVENous Continuous Marcos Becker DO 5 mL/hr at 06/20/22 1953 5 mg/hr at 06/20/22 1953    metoprolol tartrate (LOPRESSOR) tablet 50 mg  50 mg Oral BID Shimon Thompson MD        0.9 % sodium chloride infusion   IntraVENous Continuous Shimon Thompson MD 75 mL/hr at 06/20/22 1952 New Bag at 06/20/22 1952     Current Outpatient Medications   Medication Sig Dispense Refill    omeprazole (PRILOSEC) 40 MG delayed release capsule Take 40 mg by mouth daily      Multiple Vitamins-Minerals (THERAPEUTIC MULTIVITAMIN-MINERALS) tablet Take 1 tablet by mouth daily      vitamin B-12 (CYANOCOBALAMIN) 500 MCG tablet Take 500 mcg by mouth daily      calcium carbonate (OSCAL) 500 MG TABS tablet Take 500 mg by mouth daily      Olopatadine HCl (PATADAY) 0.2 % SOLN Apply  to eye.  lisinopril (PRINIVIL;ZESTRIL) 10 MG tablet Take 1 tablet by mouth daily. 90 tablet 3    lansoprazole (PREVACID SOLUTAB) 30 MG disintegrating tablet Take 30 mg by mouth every other day. Allergies   Allergen Reactions    Demerol Hcl [Meperidine]     Lisinopril Other (See Comments)     cough       Nursing Notes Reviewed    Physical Exam:  Triage VS:    ED Triage Vitals [06/20/22 1733]   Enc Vitals Group      BP (!) 142/93      Heart Rate (!) 158      Resp 17      Temp 98 °F (36.7 °C)      Temp Source Oral      SpO2 100 %      Weight 207 lb (93.9 kg)      Height 5' 4\" (1.626 m)      Head Circumference       Peak Flow       Pain Score       Pain Loc       Pain Edu? Excl. in [address]? My pulse ox interpretation is - normal    General appearance:  No acute distress. Skin:  Warm. Dry. Eye:  Extraocular movements intact. Ears, nose, mouth and throat:  Oral mucosa moist   Neck:  Trachea midline. Extremity:  No swelling. Normal ROM     Heart: Tachycardic, irregularly irregular. , normal S1 & S2, no extra heart sounds. Perfusion:  intact  Respiratory:  Lungs clear to auscultation bilaterally. Respirations nonlabored. Abdominal:  Normal bowel sounds. Soft. Nontender. Non distended. Back:  No CVA tenderness to palpation     Neurological:  Alert and oriented times 3. No focal neuro deficits.              Psychiatric:  Appropriate    I have reviewed and interpreted all of the currently available lab results from this visit (if applicable):  Results for orders placed or performed during the hospital encounter of 06/20/22   CBC with Auto Differential   Result Value Ref Range    WBC 7.1 4.0 - 10.5 K/CU MM    RBC 4.40 4.2 - 5.4 M/CU MM    Hemoglobin 12.0 (L) 12.5 - 16.0 GM/DL    Hematocrit 40.0 37 - 47 %    MCV 90.9 78 - 100 FL    MCH 27.3 27 - 31 PG    MCHC 30.0 (L) 32.0 - 36.0 %    RDW 14.1 11.7 - 14.9 %    Platelets 312 277 - 378 K/CU MM    MPV 10.5 7.5 - 11.1 FL    Differential Type AUTOMATED DIFFERENTIAL     Segs Relative 60.4 36 - 66 %    Lymphocytes % 29.1 24 - 44 %    Monocytes % 7.3 (H) 0 - 4 %    Eosinophils % 1.6 0 - 3 %    Basophils % 1.3 (H) 0 - 1 %    Segs Absolute 4.3 K/CU MM    Lymphocytes Absolute 2.1 K/CU MM    Monocytes Absolute 0.5 K/CU MM    Eosinophils Absolute 0.1 K/CU MM    Basophils Absolute 0.1 K/CU MM    Nucleated RBC % 0.0 %    Total Nucleated RBC 0.0 K/CU MM    Total Immature Neutrophil 0.02 K/CU MM    Immature Neutrophil % 0.3 0 - 0.43 %   Lipase   Result Value Ref Range    Lipase 30 13 - 60 IU/L   Troponin   Result Value Ref Range    Troponin T <0.010 <0.01 NG/ML   Brain Natriuretic Peptide   Result Value Ref Range    Pro-BNP 2,630 (H) <300 PG/ML   Urinalysis with Microscopic   Result Value Ref Range    Color, UA YELLOW YELLOW    Clarity, UA CLEAR CLEAR    Glucose, Urine NEGATIVE NEGATIVE MG/DL    Bilirubin Urine NEGATIVE NEGATIVE MG/DL    Ketones, Urine TRACE (A) NEGATIVE MG/DL    Specific Gravity, UA >1.030 1.001 - 1.035    Blood, Urine NEGATIVE NEGATIVE    pH, Urine 5.5 5.0 - 8.0    Protein, UA NEGATIVE NEGATIVE MG/DL    Urobilinogen, Urine NORMAL 0.2 - 1.0 MG/DL    Nitrite Urine, Quantitative NEGATIVE NEGATIVE    Leukocyte Esterase, Urine TRACE (A) NEGATIVE    RBC, UA NONE SEEN 0 - 6 /HPF    WBC, UA 2 0 - 5 /HPF    Bacteria, UA NEGATIVE NEGATIVE /HPF    Squam Epithel, UA 2 /HPF    Mucus, UA RARE (A) NEGATIVE HPF    Trichomonas, UA NONE SEEN NONE SEEN /HPF non squam epi cells <1 /HPF   Lactic Acid   Result Value Ref Range    Lactate 1.5 0.4 - 2.0 mMOL/L   Magnesium   Result Value Ref Range    Magnesium 1.9 1.8 - 2.4 mg/dl   Comprehensive Metabolic Panel   Result Value Ref Range    Sodium 139 135 - 145 MMOL/L    Potassium 4.5 3.5 - 5.1 MMOL/L    Chloride 103 99 - 110 mMol/L    CO2 27 21 - 32 MMOL/L    BUN 15 6 - 23 MG/DL    CREATININE 0.9 0.6 - 1.1 MG/DL    Glucose 100 (H) 70 - 99 MG/DL    Calcium 9.2 8.3 - 10.6 MG/DL    Albumin 4.0 3.4 - 5.0 GM/DL    Total Protein 6.1 (L) 6.4 - 8.2 GM/DL    Total Bilirubin 0.2 0.0 - 1.0 MG/DL    ALT 12 10 - 40 U/L    AST 23 15 - 37 IU/L    Alkaline Phosphatase 75 40 - 129 IU/L    GFR Non-African American 60 (L) >60 mL/min/1.73m2    GFR African American >60 >60 mL/min/1.73m2    Anion Gap 9 4 - 16   Protime/INR & PTT   Result Value Ref Range    Protime 13.5 11.7 - 14.5 SECONDS    INR 1.05 INDEX    aPTT 29.2 25.1 - 37.1 SECONDS   TSH   Result Value Ref Range    TSH, High Sensitivity 2.190 0.270 - 4.20 uIu/ml   T4, Free   Result Value Ref Range    T4 Free 1.09 0.9 - 1.8 NG/DL   Troponin   Result Value Ref Range    Troponin T <0.010 <0.01 NG/ML   Troponin   Result Value Ref Range    Troponin T <0.010 <0.01 NG/ML   EKG 12 Lead   Result Value Ref Range    Ventricular Rate 155 BPM    Atrial Rate 156 BPM    QRS Duration 68 ms    Q-T Interval 280 ms    QTc Calculation (Bazett) 449 ms    R Axis -18 degrees    T Axis 156 degrees    Diagnosis       Supraventricular tachycardia  Anterior infarct , age undetermined  Abnormal ECG  No previous ECGs available  Confirmed by PRISCILLA Bradley ([id number]) on 6/21/2022 5:57:57 PM     EKG 12 Lead   Result Value Ref Range    Ventricular Rate 62 BPM    Atrial Rate 62 BPM    P-R Interval 168 ms    QRS Duration 80 ms    Q-T Interval 416 ms    QTc Calculation (Bazett) 422 ms    P Axis 29 degrees    R Axis -23 degrees    T Axis 91 degrees    Diagnosis       Sinus rhythm with premature atrial complexes  T wave abnormality, consider anterolateral ischemia  Abnormal ECG  When compared with ECG of 20-JUN-2022 18:30,  Sinus rhythm has replaced Atrial fibrillation  Vent. rate has decreased BY  31 BPM     EKG 12 Lead   Result Value Ref Range    Ventricular Rate 93 BPM    Atrial Rate 326 BPM    QRS Duration 72 ms    Q-T Interval 332 ms    QTc Calculation (Bazett) 412 ms    R Axis -25 degrees    T Axis 159 degrees    Diagnosis       Atrial fibrillation  Nonspecific ST and T wave abnormality  Abnormal ECG  When compared with ECG of 20-JUN-2022 17:37,  Atrial fibrillation has replaced Sinus rhythm  Vent. rate has decreased BY  62 BPM  Confirmed by PRISCILLA Grace ([id number]) on 6/21/2022 6:07:56 PM        Radiographs (if obtained):  Radiologist's Report Reviewed:  XR CHEST PORTABLE    Result Date: 6/20/2022  EXAMINATION: ONE XRAY VIEW OF THE CHEST 6/20/2022 6:14 pm COMPARISON: Chest x-ray July 29, 2015 HISTORY: ORDERING SYSTEM PROVIDED HISTORY: afib TECHNOLOGIST PROVIDED HISTORY: Reason for exam:->afib Reason for Exam: AIFIB Additional signs and symptoms: NONE Relevant Medical/Surgical History: gerd, htn FINDINGS: Cardiac silhouette appears stable. The lungs are hypoventilatory. Chronic elevation of the right hemidiaphragm. No focal consolidation, pleural effusion, or pneumothorax. Mild atelectasis at the right lung base. Osseous structures appear stable. Unchanged sclerotic focus at the left humeral head which appears similar to exam from 2015 suggesting benign etiology and possible enchondroma. 1. Right basilar atelectasis, otherwise no acute cardiopulmonary process identified. EKG (if obtained): (All EKG's are interpreted by myself in the absence of a cardiologist)      MDM:  Patient presents emergency department with tachycardia. EKG showing SVT with patient has history of A. fib with RVR off her beta-blocker and Eliquis due to having a dental procedure tomorrow. .  Did consult cardiology Dr. Mariel Barnett.   He states to start patient on Cardizem drip and patient will need admission to the hospital.  Patient was ordered Cardizem bolus and drip. Patient did convert heart rate of 93 atrial fibrillation nonspecific ST-T wave abnormality on repeat EKG. Urinalysis negative for any urinary tract infection. Patient normal TSH and T4. Patient normal white blood cell count platelets. Patient hemoglobin of 12.0. Normal lipase negative troponin. Patient BNP of 2630. Patient normal lactic acid normal magnesium. Dr. Zachary Das did come down to evaluate the patient and place orders for continued IV fluids and metoprolol. Patient will need admission for further evaluation treatment and management. Hospitalist consulted for admission. Total critical care time 30 minutes was included multiple evaluation patient, ordering and reviewing laboratory studies, ordering and reviewing imaging studies, consulting cardiology, ordering Cardizem bolus and drip, updating patient and family on labs and imaging studies plan of care, consulting hospitalist for admission    Clinical Impression:  1. Atrial fibrillation with RVR [facility])      ED Provider Disposition Time  DISPOSITION Decision To Admit 06/20/2022 08:51:33 PM      Comment: Please note this report has been produced using speech recognition software and may contain errors related to that system including errors in grammar, punctuation, and spelling, as well as words and phrases that may be inappropriate. Efforts were made to edit the dictations.         Derek Friday, DO  06/22/22 Eloy Choi, DO  06/22/22 [id number]

## 2022-06-21 NOTE — PROCEDURES
[address], [facility]                                 ECHOCARDIOGRAM    PATIENT NAME: Arturo Siu            :        1940  MED REC NO:   [id number]                          ROOM:       3964  ACCOUNT NO:   [de-identified]                           ADMIT DATE: 2022  PROVIDER:     Malcolm Newberry MD    EUGENE-GUIDED CARDIOVERSION    The patient received 4 mg of Versed and 50 mcg of fentanyl. Posterior  oropharynx was anesthetized using viscous lidocaine gel. The patient is an 48-year-old female patient with atrial flutter and  AFib present with rapid rate present. Therefore, echocardiogram was  performed. The patient was brought to the noninvasive lab. The patient received  the above sedation. A mouthguard was placed. A EUGENE probe was advanced  to the posterior oropharynx and mid esophagus. Images were obtained. Left atrium is moderately enlarged. Some spontaneous echodensity noted,  but no clot or thrombus noted in the left atrium, left atrial appendage  or LV cavity. Mild to moderate mitral regurgitation noted. Central jet  present. LV function is preserved at 54%. No pericardial effusion  noted. Interatrial septum is slightly aneurysmal, but no ASD or PFO is  noted. Color Doppler and bubble study both were negative. Tricuspid  valve is normal.  Pulmonic valve is normal.  Aortic valve is normal.   Ascending aorta is normal.    IMPRESSION:  1. Left atrium is moderately enlarged. 2.  Spontaneous echodensity noted, but no clot or thrombus noted in the  left atrium or left atrial appendage. 3.  Mild to moderate mitral regurgitation noted. 4.  LV function is preserved at 50%. EUGENE probe was removed with no complication. The patient was cardioverted successfully from atrial flutter/fib to  sinus rhythm with one shock of 200 joules.     IMPRESSION:  Successful cardioversion from atrial fib/flutter to sinus  rhythm with one shock of 200 joules. PLAN:  The patient will be loaded with amiodarone and received  anticoagulation. The patient needs to hold off on dental procedure for at least  six weeks post cardioversion.         Esthela Domínguez MD    D: 06/21/2022 9:22:54       T: 06/21/2022 11:02:59     [email or web address]  Job#: [id number]     Doc#: [id number]    CC:

## 2022-06-21 NOTE — PROGRESS NOTES
Hospitalist Progress Note      Name:  Janet Bolivar /Age/Sex: 1940  (79 y.o. female)   MRN & CSN:  [id number] & [id number] Admission Date/Time: 2022  5:28 PM   [facility] PCP: Brittany Devries, [address] Day: 2  Discharge barrier/Reason for continued hospitalization: Home tomorrow. S/p electrical cardioversion today. Assessment and Plan:   Janet Bolivar is a 79 y.o.  female COVID-19 with residual paroxysmal A. fib on chronic anticoagulation with Eliquis who presented to the ED on 2022 at the advice of spouses cardiologist due to finding of  Atrial fibrillation with RVR ([address] Patient reported slight chest/heart flutter 3 days prior to office visit. 1.  Paroxysmal A. fib with RVR: S/p successful electrical cardioversion today  Noted amiodarone loading and maintenance for maintenance of sinus rhythm  Plan to DC in a.m. Resume Eliquis tonight  Continue Lopressor 50 mg twice daily    2. Restless leg syndrome: Start Requip tonight    3. GERD: Continue Protonix  4. Hypertension: Continue Lopressor 50 mg twice daily    Diet ADULT DIET; Regular   DVT Prophylaxis [] Lovenox, []  Heparin, [] SCDs, [] Ambulation. Eliquis   GI Prophylaxis [] PPI,  [] H2 Blocker,  [] Carafate,  [x] Diet/Tube Feeds   Code Status Full Code   MDM [] Low, [] Moderate,[x]  High  >50% of encounter time spent in counseling/coordination of care     History of Present Illness:     Chief Complaint: Atrial fibrillation with RVR ([address]     Janet oBlivar is a 79 y.o.  female  who presents with A. fib with RVR incidentally discovered at cardiologist office. 2022: Patient is seen and examined, had cardioversion this morning. She has no symptoms. She is thankful for her care so far. She complains of leg cramping that makes her want to get up and walk every night. This leads to insomnia. She thinks it is her varicose veins and she wants to update them.   I did  her about RLS       Ten point ROS reviewed, negative, unless as noted above    Objective:   No intake or output data in the 24 hours ending 06/21/22 1658     Vitals:   Vitals:    06/21/22 0927 06/21/22 0945 06/21/22 1138 06/21/22 1600   BP: (!) 105/55 (!) 102/58 (!) 121/49 (!) 122/58   Pulse: 60 60 61 61   Resp: 16 20 25 20   Temp:   97.6 °F (36.4 °C) 97.4 °F (36.3 °C)   TempSrc:   Oral Oral   SpO2: 100% 98% 99% 97%   Weight:       Height:            Physical Exam:   GEN: Awake female, alert and oriented x3 in no apparent distress. Appears given age. HEENT: Normal.  RESP: Clear lung fields bilaterally. Symmetric chest movement while on room air  CVS: RRR, S1, S2  GI/: Abdomen is soft, nontender, no organomegaly. . Bowel sounds normal, rectal exam deferred. No CVA tenderness. MSK: No gross joint deformities. No tenderness  SKIN: Normal coloration, warm, dry. Bilateral lower extremity varicosity. NEURO: Cranial nerves appear grossly intact, normal speech, no lateralizing weakness. PSYCH:  Affect appropriate.     Medications:   Medications:    rOPINIRole  1 mg Oral Nightly    metoprolol tartrate  50 mg Oral BID    calcium elemental  500 mg Oral Daily    pantoprazole  40 mg Oral QAM AC    vitamin B-12  500 mcg Oral Daily    sodium chloride flush  5-40 mL IntraVENous BID    apixaban  5 mg Oral BID      Infusions:    amiodarone 0.5 mg/min (06/21/22 1443)    sodium chloride 75 mL/hr at 06/21/22 0611    sodium chloride       PRN Meds: naloxone, 0.4 mg, PRN  sodium chloride flush, 5-40 mL, PRN  sodium chloride, , PRN  ondansetron, 4 mg, Q8H PRN   Or  ondansetron, 4 mg, Q6H PRN  polyethylene glycol, 17 g, Daily PRN  acetaminophen, 650 mg, Q6H PRN   Or  acetaminophen, 650 mg, Q6H PRN          Electronically signed by Aurelia Benavides MD on 6/21/2022 at 4:58 PM

## 2022-06-21 NOTE — CONSULTS
Dictated -[id number]  Plan for EUGENE and CV-today  Give one dose of lovenox  Load with amiodarone    Electronically signed by Lemont Cheadle, MD on 6/21/22 at 8:19 AM EDT

## 2022-06-21 NOTE — H&P
No change in H/P  ASA 3  Mal  3  Proceed with EUGENE and CV     Successful cardioversion   Load with amiodarone  Watch today  Home tomorrow if istable    Electronically signed by Krzysztof London MD on 6/21/22 at 9:26 AM EDT

## 2022-06-21 NOTE — PROGRESS NOTES
This is a late note:   Pili Zeng was in office with her  on 06/20/22- she requested HR to be checked as she stated her HR per her home machine was reading high. Radial pulse per staff reported at > 140. EKG obtained- noted to have narrow complex SVT rate 156- advised to go to the ED for evaluation.

## 2022-06-21 NOTE — H&P
History and Physical      Name:  Gerardo Snyder /Age/Sex: 1940  (79 y.o. female)   MRN & CSN:  [id number] & [id number] Admission Date/Time: 2022  5:28 PM   Location:  ED30/ED-30 PCP: Karri Callahan MD       Hospital Day: 1     Attending physician: Dr. Nelson Serrato and Plan:   Gerardo Snyder is a 79 y.o.  female  who presents with Atrial fibrillation with RVR ([address]    Assessment and plan:     Atrial fibrillation with rapid ventricular response- CHADS Vasc score-4  EKG-heart rate 156, SVT, QTc 449. Last echo-EF 57%-  Stress test -negative for ischemia and normal EF.  -Intermediate care  -Cardizem bolus of 10 mg mg given   -Continue Cardizem drip   -EKG as needed for chest pain or rhythm changes  -Target HR <110 to avoid tachycardia-mediated cardiomyopathy  -Metoprolol 50 mg twice daily-p.o. restarted by cardiology  -1L NS bolus  -serial cardiac enzymes  -cardiologic consultation  -Anticoagulant Eliquis  -Rate control with metoprolol  -TSH 2.19, free T4 1.09, magnesium 1.9, no electrolyte normalities    GERD: Continue PPI    Hypertension: Continue beta-blocker: Patient states she no longer takes lisinopril secondary to side effect of cough. -Monitor blood pressure trends    Hyperlipidemia: Diet controlled    Chronic Conditions: Continue all home medications except as stated above or contraindicated. BMI 35.53: kg/m2 Life style modifications    Disposition: Inpatient. Patient was accepted for continue evaluation and treatment and improvement of clinical symptoms. Discussed assessment and treatment plan with the admitting and supervising physician who agrees with the plan.      Diet  heart healthy   DVT Prophylaxis [] Lovenox, []  Heparin, [] SCDs, [] Warfarin  [x] NOAC     GI Prophylaxis [x] PPI,  [] H2 Blocker,  [] Carafate,  [] Diet/Tube Feeds   Code Status  full     History of Present Illness:     Chief Complaint: Atrial fibrillation with RVR [facility])  Kristel Frederick is a 79 y.o.  female, with past medical history significant for atrial fibrillation, hyperlipidemia, GERD, who presented to the emergency room with complaint of tachycardic heart rate. Patient states she noticed Saturday she had a fluttering in her chest.  States she was in the emergency room all day Friday with her  for approximate 11 hours do not take any of her medications. She is on metoprolol and Eliquis for atrial fibrillation. She states she is to have dental work completed tomorrow and was to stop taking her Eliquis on Sunday. States she was at cardiologist with her  and noted her heart rate was fast on the monitor and they obtained an EKG. Sent her to the emergency room for further evaluation she was at [facility] however her cardiologist is Dr. Moira Vasquez. She denies any chest pain shortness of breath, no lightheadedness, dizziness or weakness. She denies any nausea, vomiting or abdominal pain. She does endorse constipation but denies any hematic emesis, hematochezia or melena. On Examination,the patient is able to state history consistent. At the ED, vital signs;T 98,, RR 17, /93 mm/hg,SPO2 100% RA. Prior to emergency room heart rate was 158, EKG obtained showed sinus tachycardia patient was given Cardizem 10 mg IV bolus heart rate slowed down demonstrated A. fib with RVR. Cardiology was consulted patient was started on Cardizem drip and her p.o. metoprolol was restarted. Significant laboratories were the following: proBNP 2630, hemoglobin 12 otherwise labs are unremarkable. The patient was brought to the ED and was subsequently admitted for  further evaluation. and management        Review of Systems   Constitutional: Negative for chills and fever. HENT: Negative for congestion. Respiratory: Negative for cough, chest tightness and shortness of breath. Cardiovascular: Positive for palpitations. Negative for chest pain. Gastrointestinal: Positive for constipation. Negative for abdominal pain, blood in stool, nausea and vomiting. Genitourinary: Negative for dysuria. Musculoskeletal: Negative. Skin: Negative. Neurological: Negative for dizziness and light-headedness. Hematological: Bruises/bleeds easily. Psychiatric/Behavioral: Negative. Objective:   No intake or output data in the 24 hours ending 06/20/22 2119   Vitals:   Vitals:    06/20/22 2200   BP: 131/87   Pulse: (!) 146   Resp: 15   Temp:    SpO2: 97%     Physical Exam:   GEN- Awake female, NAD, sitting up in bed, appears to be stated age. EYES- Pupils are equally round. HENT- Mucous membranes are moist.   NECK- Supple, no apparent thyromegaly or masses. RESP-Clear to auscultation, no wheezes, rales or rhonchi. Symmetric chest movement while on room air. CARDIO/VASC-  Tachycardic irregular,  Peripheral pulses equal bilaterally and palpable. GI- Abdomen is soft, no tenderness, masses, or guarding. Bowel sounds present. MSK- No gross joint deformities. EXTREMITIES- pulses intact, no swelling, no calf tenderness  SKIN- Normal coloration, warm, dry. NEURO-Cranial nerves appear grossly intact, normal speech, no lateralizing weakness. PSYCH-Awake, alert, oriented x 4. Past Medical History:      Past Medical History:   Diagnosis Date    Basal cell carcinoma of nasal tip 8/2012    EKG abnormality     8/14/2012-incomplete LBBB, PVCs, poor R-wave progression and low QRS voltages noted.  Family history of coronary artery disease     Brother-CABG-age 54    GERD (gastroesophageal reflux disease)     H/O cardiovascular stress test 8/14/2012 8/14/2012-Normal perfusion. EF 70%    H/O Doppler ultrasound 05/21/15    Arterial Doppler: negative arterial doppler    Hyperlipidemia     diet controlled    Hypertension      PSHX:  has a past surgical history that includes Skin cancer excision (8/2012); Cholecystectomy (2010);  Breast lumpectomy (Right, 1989); and Hysterectomy (1985). Allergies: Allergies   Allergen Reactions    Demerol Hcl [Meperidine]     Lisinopril Other (See Comments)     cough       FAM HX: family history includes Cancer in her mother and sister; Coronary Art Dis in her brother; Heart Disease in her father. Soc HX:   Social History     Socioeconomic History    Marital status:      Spouse name: None    Number of children: 2    Years of education: None    Highest education level: None   Occupational History    Occupation: RETIRED     Comment: [employer]   Tobacco Use    Smoking status: Never Smoker    Smokeless tobacco: Never Used   Substance and Sexual Activity    Alcohol use: No     Comment: CAFFEINE: 2 cups tea, 1 cola daily    Drug use: No    Sexual activity: Yes     Partners: Male     Comment:    Other Topics Concern    None   Social History Narrative    None     Social and family history reviewed with patient/family. Medications:     Home Medication   Prior to Admission medications    Medication Sig Start Date End Date Taking? Authorizing Provider   apixaban (ELIQUIS) 5 MG TABS tablet Take 5 mg by mouth 2 times daily   Yes Historical Provider, MD   omeprazole (PRILOSEC) 40 MG delayed release capsule Take 40 mg by mouth daily 7/10/20 7/10/21  Historical Provider, MD   Multiple Vitamins-Minerals (THERAPEUTIC MULTIVITAMIN-MINERALS) tablet Take 1 tablet by mouth daily    Historical Provider, MD   vitamin B-12 (CYANOCOBALAMIN) 500 MCG tablet Take 500 mcg by mouth daily    Historical Provider, MD   calcium carbonate (OSCAL) 500 MG TABS tablet Take 500 mg by mouth daily    Historical Provider, MD   Olopatadine HCl (PATADAY) 0.2 % SOLN Apply  to eye. Historical Provider, MD   lisinopril (PRINIVIL;ZESTRIL) 10 MG tablet Take 1 tablet by mouth daily. 11/1/13   Sundar Fischer MD   lansoprazole (PREVACID SOLUTAB) 30 MG disintegrating tablet Take 30 mg by mouth every other day.     Historical Provider, MD     Medications:    metoprolol tartrate  50 mg Oral BID      Infusions:    dilTIAZem (CARDIZEM) 100 mg in dextrose 5% 100 mL (ADD-[place]) 5 mg/hr (06/20/22 1953)    sodium chloride 75 mL/hr at 06/20/22 1952     PRN Meds:      Recent Labs     06/20/22  1749   WBC 7.1   HGB 12.0*   HCT 40.0         Recent Labs     06/20/22  1749      K 4.5      CO2 27   BUN 15   CREATININE 0.9     Recent Labs     06/20/22  1749   AST 23   ALT 12   BILITOT 0.2   ALKPHOS 75     Recent Labs     06/20/22  1750   INR 1.05     Recent Labs     06/20/22  1749   TROPONINT <0.010        Imaging reviewed  XR CHEST PORTABLE    Result Date: 6/20/2022  EXAMINATION: ONE XRAY VIEW OF THE CHEST 6/20/2022 6:14 pm COMPARISON: Chest x-ray July 29, 2015 HISTORY: ORDERING SYSTEM PROVIDED HISTORY: afib TECHNOLOGIST PROVIDED HISTORY: Reason for exam:->afib Reason for Exam: AIFIB Additional signs and symptoms: NONE Relevant Medical/Surgical History: gerd, htn FINDINGS: Cardiac silhouette appears stable. The lungs are hypoventilatory. Chronic elevation of the right hemidiaphragm. No focal consolidation, pleural effusion, or pneumothorax. Mild atelectasis at the right lung base. Osseous structures appear stable. Unchanged sclerotic focus at the left humeral head which appears similar to exam from 2015 suggesting benign etiology and possible enchondroma. 1. Right basilar atelectasis, otherwise no acute cardiopulmonary process identified.           Electronically signed by MORENA Hoover CNP on 6/20/2022 at 9:19 PM

## 2022-06-21 NOTE — CONSULTS
[address] [place] [address], [address] [facility]                                  CONSULTATION    PATIENT NAME: Eric Andre            :        1940  MED REC NO:   [id number]                          ROOM:       8519  ACCOUNT NO:   [de-identified]                           ADMIT DATE: 2022  PROVIDER:     Swetha Cancino MD    CONSULT DATE:  2022    INDICATION:  Atrial flutter. HISTORY OF PRESENT ILLNESS:  This is an 34-year-old female patient. She  follows in the office. She had a stress test done last year. It was  negative for ischemia. LV function was preserved. She has a history of  paroxysmal atrial fibrillation present. She said she has been out of  her medications for a few days. Yesterday, she was with her  who  sees Dr. IRVING SAENZ [facility] and there she _____ EKG and her heart rate was  elevated and sent her to the ER. She is in atrial flutter right now. The rate is controlled. She is  started on Cardizem drip. The Cardizem drip is off at this time. The  patient denies any chest pain. No shortness of breath. No fever, no  chills, no cough production . No other  or GI complaints present. PAST MEDICAL HISTORY:  History of paroxysmal atrial fibrillation, last  stress test was done in 2021. LV function preserved. No ischemia  noted. She has a history of hypertension. History of GERD and  hyperlipidemia present. Primary physician is Dr. Eli Chapin. PAST SURGICAL HISTORY:  Gallbladder surgery, hysterectomy. SOCIAL HISTORY:  Does not smoke. Does not drink. ALLERGIES:  DEMEROL and LISINOPRIL. MEDICATIONS:  Eliquis and beta blockers. PHYSICAL EXAMINATION:  GENERAL:  The patient is awake, alert, and answering questions, not in  acute distress. VITAL SIGNS:  Temperature afebrile. Pulse is 70s, atrial flutter. Blood pressure is 120/60. HEENT:  Head is normocephalic and atraumatic.

## 2022-06-22 VITALS
RESPIRATION RATE: 16 BRPM | WEIGHT: 176.6 LBS | SYSTOLIC BLOOD PRESSURE: 132 MMHG | HEIGHT: 64 IN | TEMPERATURE: 98 F | DIASTOLIC BLOOD PRESSURE: 53 MMHG | OXYGEN SATURATION: 95 % | HEART RATE: 61 BPM | BODY MASS INDEX: 30.15 KG/M2

## 2022-06-22 PROCEDURE — 2580000003 HC RX 258: Performed by: NURSE PRACTITIONER

## 2022-06-22 PROCEDURE — 6370000000 HC RX 637 (ALT 250 FOR IP): Performed by: INTERNAL MEDICINE

## 2022-06-22 PROCEDURE — 6370000000 HC RX 637 (ALT 250 FOR IP): Performed by: NURSE PRACTITIONER

## 2022-06-22 PROCEDURE — 6360000002 HC RX W HCPCS: Performed by: NURSE PRACTITIONER

## 2022-06-22 RX ORDER — AMIODARONE HYDROCHLORIDE 200 MG/1
200 TABLET ORAL DAILY
Status: DISCONTINUED | OUTPATIENT
Start: 2022-06-22 | End: 2022-06-22 | Stop reason: HOSPADM

## 2022-06-22 RX ORDER — METOPROLOL TARTRATE 50 MG/1
50 TABLET, FILM COATED ORAL 2 TIMES DAILY
Status: DISCONTINUED | OUTPATIENT
Start: 2022-06-22 | End: 2022-06-22

## 2022-06-22 RX ORDER — AMIODARONE HYDROCHLORIDE 200 MG/1
200 TABLET ORAL DAILY
Qty: 30 TABLET | Refills: 2 | Status: SHIPPED | OUTPATIENT
Start: 2022-06-22 | End: 2022-10-05 | Stop reason: ALTCHOICE

## 2022-06-22 RX ORDER — METOPROLOL SUCCINATE 25 MG/1
25 TABLET, EXTENDED RELEASE ORAL DAILY
Qty: 30 TABLET | Refills: 3 | Status: SHIPPED | OUTPATIENT
Start: 2022-06-22

## 2022-06-22 RX ORDER — METOPROLOL SUCCINATE 25 MG/1
25 TABLET, EXTENDED RELEASE ORAL DAILY
Status: DISCONTINUED | OUTPATIENT
Start: 2022-06-22 | End: 2022-06-22 | Stop reason: HOSPADM

## 2022-06-22 RX ORDER — ROPINIROLE 1 MG/1
1 TABLET, FILM COATED ORAL NIGHTLY
Qty: 90 TABLET | Refills: 2 | Status: SHIPPED | OUTPATIENT
Start: 2022-06-22

## 2022-06-22 RX ADMIN — CYANOCOBALAMIN TAB 1000 MCG 500 MCG: 1000 TAB at 08:46

## 2022-06-22 RX ADMIN — CALCIUM 500 MG: 500 TABLET ORAL at 08:47

## 2022-06-22 RX ADMIN — PANTOPRAZOLE SODIUM 40 MG: 40 TABLET, DELAYED RELEASE ORAL at 09:51

## 2022-06-22 RX ADMIN — ONDANSETRON 4 MG: 2 INJECTION INTRAMUSCULAR; INTRAVENOUS at 04:08

## 2022-06-22 RX ADMIN — SODIUM CHLORIDE, PRESERVATIVE FREE 5 ML: 5 INJECTION INTRAVENOUS at 08:47

## 2022-06-22 RX ADMIN — APIXABAN 5 MG: 5 TABLET, FILM COATED ORAL at 08:46

## 2022-06-22 RX ADMIN — AMIODARONE HYDROCHLORIDE 200 MG: 200 TABLET ORAL at 09:51

## 2022-06-22 RX ADMIN — METOPROLOL SUCCINATE 25 MG: 25 TABLET, EXTENDED RELEASE ORAL at 09:53

## 2022-06-22 ASSESSMENT — PAIN SCALES - GENERAL
PAINLEVEL_OUTOF10: 0
PAINLEVEL_OUTOF10: 0

## 2022-06-22 NOTE — SIGNIFICANT EVENT
Discussed case with cardiologist again while making rounds and at this time, he will change Lopressor to Toprol-XL and to keep active dose of 25 mg daily.

## 2022-06-22 NOTE — PROGRESS NOTES
Discharge complete. This nurse went over discharge instructions with patient. Patient voiced understanding. Patient wheeled to pharmacy for med pickup and then to front entrance for pickup. All belongings with patient. No complaints.

## 2022-06-22 NOTE — PROGRESS NOTES
Outpatient Pharmacy Progress Note for Meds-to-Beds    Total number of Prescriptions Filled: 2  The following medications were dispensed to the patient during the discharge process:  1 amiodarone  2 rOPINIRole  3 metoprolol succinate        Additional Documentation:   Patient picked-up the medication(s) in the OP Pharmacy     Thank you for letting us serve your patients.   [address] [place]    [address], [address] [facility]    Phone: [phone]    Fax: [phone]

## 2022-06-22 NOTE — PROGRESS NOTES
Daily Progress Note     patient is awake alert feeling better  No chest pain no dizziness  Remain in sinus rate is stable  Ok to d/c home  Keep on Toprol  25mg daily and amiodarone 200mg daily  Hold off on dental procedure for 6 weeks  Stay on [facility]  She had been on betablockers -but stopped both betablockers and AC -three days and believe that is the reason she went into afib  EUGENE and CV yesterday  Ok for home from cardiac stand  F/u in office in few weeks      Objective:   BP (!) 132/53   Pulse 50   Temp 98 °F (36.7 °C) (Oral)   Resp 16   Ht 5' 4\" (1.626 m)   Wt 176 lb 9.6 oz (80.1 kg)   SpO2 95%   BMI 30.31 kg/m²   No intake or output data in the 24 hours ending 06/22/22 0944    Medications:   Scheduled Meds:   amiodarone  200 mg Oral Daily    metoprolol succinate  25 mg Oral Daily    rOPINIRole  1 mg Oral Nightly    calcium elemental  500 mg Oral Daily    pantoprazole  40 mg Oral QAM AC    vitamin B-12  500 mcg Oral Daily    sodium chloride flush  5-40 mL IntraVENous BID    apixaban  5 mg Oral BID      Infusions:   sodium chloride        PRN Meds:  naloxone, sodium chloride flush, sodium chloride, ondansetron **OR** ondansetron, polyethylene glycol, acetaminophen **OR** acetaminophen       Physical Exam:  Vitals:    06/22/22 0830   BP: (!) 132/53   Pulse:    Resp:    Temp: 98 °F (36.7 °C)   SpO2: 95%        General: awake alert   Chest: Nontender  Cardiac: sinus   Lungs:Clear to auscultation and percussion. Abdomen: Soft, NT, ND, +BS  Extremities: no edema   Vascular:  Equal 2+ peripheral pulses.         Lab Data:  CBC:   Recent Labs     06/20/22  1749   WBC 7.1   HGB 12.0*   HCT 40.0   MCV 90.9        BMP:   Recent Labs     06/20/22  1749      K 4.5      CO2 27   BUN 15   CREATININE 0.9     LIVER PROFILE:   Recent Labs     06/20/22  1749   AST 23   ALT 12   LIPASE 30   BILITOT 0.2   ALKPHOS 75     PT/INR:   Recent Labs     06/20/22  1750   PROTIME 13.5   INR 1.05     APTT: Recent Labs     06/20/22  1750   APTT 29.2     BNP:  No results for input(s): BNP in the last 72 hours.       Assessment:  Patient Active Problem List    Diagnosis Date Noted    Atrial fibrillation with RVR ([facility] [address] 06/20/2022    Chest pain     Hyperlipidemia with target LDL less than 130 11/06/2013    Hyperlipidemia     Hypertension        Ramon Villatoro MD, MD 6/22/2022 9:44 AM

## 2022-06-22 NOTE — PROGRESS NOTES
Paged Dr Fausto Boyce:    Patient has new orders for amio 200mg and change in lopress from 25mg to 50mg. Her heart rate is only 52. Did you want held?

## 2022-06-22 NOTE — DISCHARGE SUMMARY
Discharge Summary    Name:  Ladonna Denver /Age/Sex: 1940  (79 y.o. female)   MRN & CSN:  [id number] & [id number] Admission Date/Time: 2022  5:28 PM   Attending:  Jo Hernandez MD Discharging Physician: Jo Hernandez MD     Hospital Course:   Ladonna Denver is a 79 y.o.   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 fib on chronic [address] [place] [address] Road with Eliquis, hypertension, GERD who presents with Atrial fibrillation with RVR ([address] that was detected in the cardiologist office when patient was visiting her 's cardiologist.  Upon arrival to the ED, patient was in A. fib with RVR. Patient was started on Cardizem bolus and gtt. for rate control. On 2022, patient underwent electrical cardioversion and was started on amiodarone gtt. Patient maintained sinus rhythm but became profoundly bradycardic overnight. Amiodarone drip was discontinued. Patient is now started on amiodarone 200 mg daily in addition to Lopressor 50 mg twice daily per cardiology recommendation. Note that she used to be on 25 mg twice daily of Lopressor PTA. Patient to follow-up closely with cardiology in the outpatient. Hospital course was remarkable for patient's complaints of symptoms suggestive of restless leg syndrome. She was started on therapeutic trial of Requip 1 mg nightly with good effect. The patient/family expressed appropriate understanding of and agreement with the discharge recommendations, medications, and plan.      Consults this admission:  IP CONSULT TO CARDIOLOGY  IP CONSULT TO HOSPITALIST    Discharge Instruction:   Follow up appointments: Cardiology  Primary care physician:  within 2 weeks    Diet:  cardiac diet   Activity: activity as tolerated  Disposition: Discharged to:   [x]Home, []C, []SNF, []Acute Rehab, []Hospice   Condition on discharge: Stable    Discharge Medications:        Medication List      START taking these medications amiodarone 200 MG tablet  Commonly known as: CORDARONE  Take 1 tablet by mouth daily     rOPINIRole 1 MG tablet  Commonly known as: REQUIP  Take 1 tablet by mouth nightly        CHANGE how you take these medications    metoprolol tartrate 25 MG tablet  Commonly known as: LOPRESSOR  Take 2 tablets by mouth 2 times daily  What changed: how much to take     omeprazole 40 MG delayed release capsule  Commonly known as: PRILOSEC  What changed: Another medication with the same name was removed. Continue taking this medication, and follow the directions you see here. CONTINUE taking these medications    albuterol sulfate  (90 Base) MCG/ACT inhaler  Commonly known as: PROVENTIL;VENTOLIN;PROAIR     calcium carbonate 500 MG Tabs tablet  Commonly known as: OSCAL     Eliquis 5 MG Tabs tablet  Generic drug: apixaban     montelukast 10 MG tablet  Commonly known as: SINGULAIR     vitamin B-12 500 MCG tablet  Commonly known as: CYANOCOBALAMIN        STOP taking these medications    lisinopril 10 MG tablet  Commonly known as: PRINIVIL;ZESTRIL     Pataday 0.2 % Soln ophthalmic solution  Generic drug: olopatadine     therapeutic multivitamin-minerals tablet           Where to Get Your Medications      These medications were sent to [address] [place] [address] [address] [facility] [address]    Phone: [phone]   · amiodarone 200 MG tablet  · metoprolol tartrate 25 MG tablet  · rOPINIRole 1 MG tablet          Objective Findings at Discharge:   BP (!) 132/53   Pulse 50   Temp 98 °F (36.7 °C) (Oral)   Resp 16   Ht 5' 4\" (1.626 m)   Wt 176 lb 9.6 oz (80.1 kg)   SpO2 95%   BMI 30.31 kg/m²            PHYSICAL EXAM  GEN: Awake female, alert and oriented x 3 in no apparent distress. Appears given age. HEENT: Normal   NECK: Supple, no apparent thyromegaly or masses. No SOURAV  RESP: Clear lung fields bilaterally.    CVS: RRR, currently bradycardic, S1, S2  GI/: Abdomen is soft, nontender, no organomegaly. . Bowel sounds normal, rectal exam deferred. No CVA tenderness. MSK: No gross joint deformities. No tenderness  SKIN: Normal coloration, warm, dry. Varicose veins in lower legs  NEURO: Cranial nerves appear grossly intact, normal speech, no lateralizing weakness. PSYCH: Affect appropriate.     BMP/CBC  Recent Labs     06/20/22  1749      K 4.5      CO2 27   BUN 15   CREATININE 0.9   WBC 7.1   HCT 40.0        Discharge Time of 25 minutes    Electronically signed by Carolyne Lopez MD on 6/22/2022 at 8:54 AM

## 2022-10-05 PROBLEM — I83.93 VARICOSE VEINS OF BOTH LOWER EXTREMITIES WITHOUT ULCER OR INFLAMMATION: Status: ACTIVE | Noted: 2022-10-05

## 2022-11-03 ENCOUNTER — HOSPITAL ENCOUNTER (OUTPATIENT)
Age: 82
Discharge: HOME | End: 2022-11-03
Payer: MEDICARE

## 2022-11-03 LAB
ALBUMIN SERPL-MCNC: 3.9 GM/DL (ref 3.4–5)
ALP BLD-CCNC: 127 IU/L (ref 40–128)
ALT SERPL-CCNC: 11 U/L (ref 10–40)
ANION GAP SERPL CALCULATED.3IONS-SCNC: 10 MMOL/L (ref 4–16)
AST SERPL-CCNC: 22 IU/L (ref 15–37)
BASOPHILS ABSOLUTE: 0.1 K/CU MM
BASOPHILS RELATIVE PERCENT: 1.8 % (ref 0–1)
BILIRUB SERPL-MCNC: 0.4 MG/DL (ref 0–1)
BUN BLDV-MCNC: 11 MG/DL (ref 6–23)
CALCIUM SERPL-MCNC: 9.3 MG/DL (ref 8.3–10.6)
CHLORIDE BLD-SCNC: 101 MMOL/L (ref 99–110)
CHOLESTEROL: 193 MG/DL
CO2: 27 MMOL/L (ref 21–32)
CREAT SERPL-MCNC: 0.8 MG/DL (ref 0.6–1.1)
DIFFERENTIAL TYPE: ABNORMAL
EOSINOPHILS ABSOLUTE: 0.1 K/CU MM
EOSINOPHILS RELATIVE PERCENT: 2.4 % (ref 0–3)
ESTIMATED AVERAGE GLUCOSE: 97 MG/DL
GFR SERPL CREATININE-BSD FRML MDRD: >60 ML/MIN/1.73M2
GLUCOSE BLD-MCNC: 91 MG/DL (ref 70–99)
HBA1C MFR BLD: 5 % (ref 4.2–6.3)
HCT VFR BLD CALC: 35.2 % (ref 37–47)
HDLC SERPL-MCNC: 50 MG/DL
HEMOGLOBIN: 9.9 GM/DL (ref 12.5–16)
IMMATURE NEUTROPHIL %: 0.8 % (ref 0–0.43)
LDL CHOLESTEROL CALCULATED: 107 MG/DL
LYMPHOCYTES ABSOLUTE: 1.5 K/CU MM
LYMPHOCYTES RELATIVE PERCENT: 30.1 % (ref 24–44)
MCH RBC QN AUTO: 23.6 PG (ref 27–31)
MCHC RBC AUTO-ENTMCNC: 28.1 % (ref 32–36)
MCV RBC AUTO: 83.8 FL (ref 78–100)
MONOCYTES ABSOLUTE: 0.5 K/CU MM
MONOCYTES RELATIVE PERCENT: 9.4 % (ref 0–4)
NUCLEATED RBC %: 0 %
PDW BLD-RTO: 17.3 % (ref 11.7–14.9)
PLATELET # BLD: 304 K/CU MM (ref 140–440)
PMV BLD AUTO: 10.3 FL (ref 7.5–11.1)
POTASSIUM SERPL-SCNC: 4.5 MMOL/L (ref 3.5–5.1)
RBC # BLD: 4.2 M/CU MM (ref 4.2–5.4)
SEGMENTED NEUTROPHILS ABSOLUTE COUNT: 2.8 K/CU MM
SEGMENTED NEUTROPHILS RELATIVE PERCENT: 55.5 % (ref 36–66)
SODIUM BLD-SCNC: 138 MMOL/L (ref 135–145)
TOTAL IMMATURE NEUTOROPHIL: 0.04 K/CU MM
TOTAL NUCLEATED RBC: 0 K/CU MM
TOTAL PROTEIN: 5.8 GM/DL (ref 6.4–8.2)
TRIGL SERPL-MCNC: 181 MG/DL
URIC ACID: 4.2 MG/DL (ref 2.6–6)
WBC # BLD: 5 K/CU MM (ref 4–10.5)

## 2022-11-03 PROCEDURE — 80053 COMPREHEN METABOLIC PANEL: CPT

## 2022-11-03 PROCEDURE — 83036 HEMOGLOBIN GLYCOSYLATED A1C: CPT

## 2022-11-03 PROCEDURE — 36415 COLL VENOUS BLD VENIPUNCTURE: CPT

## 2022-11-03 PROCEDURE — 84550 ASSAY OF BLOOD/URIC ACID: CPT

## 2022-11-03 PROCEDURE — 80061 LIPID PANEL: CPT

## 2022-11-03 PROCEDURE — 85025 COMPLETE CBC W/AUTO DIFF WBC: CPT

## 2023-01-04 ENCOUNTER — HOSPITAL ENCOUNTER (OUTPATIENT)
Age: 83
Discharge: HOME | End: 2023-01-04
Payer: MEDICARE

## 2023-01-04 ENCOUNTER — HOSPITAL ENCOUNTER (OUTPATIENT)
Dept: GENERAL RADIOLOGY | Age: 83
Discharge: HOME | End: 2023-01-04
Payer: MEDICARE

## 2023-01-04 DIAGNOSIS — R06.02 SHORTNESS OF BREATH: ICD-10-CM

## 2023-01-04 LAB
BASOPHILS ABSOLUTE: 0.1 K/CU MM
BASOPHILS RELATIVE PERCENT: 1.8 % (ref 0–1)
DIFFERENTIAL TYPE: ABNORMAL
EOSINOPHILS ABSOLUTE: 0.2 K/CU MM
EOSINOPHILS RELATIVE PERCENT: 3.5 % (ref 0–3)
FERRITIN: 11 NG/ML (ref 15–150)
FOLATE: 16.4 NG/ML (ref 3.1–17.5)
HCT VFR BLD CALC: 31.6 % (ref 37–47)
HEMOGLOBIN: 8.8 GM/DL (ref 12.5–16)
IMMATURE NEUTROPHIL %: 0.4 % (ref 0–0.43)
IRON: 35 UG/DL (ref 37–145)
LYMPHOCYTES ABSOLUTE: 1.5 K/CU MM
LYMPHOCYTES RELATIVE PERCENT: 26.1 % (ref 24–44)
MCH RBC QN AUTO: 22.9 PG (ref 27–31)
MCHC RBC AUTO-ENTMCNC: 27.8 % (ref 32–36)
MCV RBC AUTO: 82.1 FL (ref 78–100)
MONOCYTES ABSOLUTE: 0.4 K/CU MM
MONOCYTES RELATIVE PERCENT: 7.8 % (ref 0–4)
NUCLEATED RBC %: 0 %
PCT TRANSFERRIN: 8 % (ref 10–44)
PDW BLD-RTO: 16 % (ref 11.7–14.9)
PLATELET # BLD: 295 K/CU MM (ref 140–440)
PMV BLD AUTO: 10.2 FL (ref 7.5–11.1)
RBC # BLD: 3.85 M/CU MM (ref 4.2–5.4)
SEGMENTED NEUTROPHILS ABSOLUTE COUNT: 3.4 K/CU MM
SEGMENTED NEUTROPHILS RELATIVE PERCENT: 60.4 % (ref 36–66)
TOTAL IMMATURE NEUTOROPHIL: 0.02 K/CU MM
TOTAL IRON BINDING CAPACITY: 420 UG/DL (ref 250–450)
TOTAL NUCLEATED RBC: 0 K/CU MM
UNSATURATED IRON BINDING CAPACITY: 385 UG/DL (ref 110–370)
VITAMIN B-12: 944.5 PG/ML (ref 211–911)
WBC # BLD: 5.6 K/CU MM (ref 4–10.5)

## 2023-01-04 PROCEDURE — 36415 COLL VENOUS BLD VENIPUNCTURE: CPT

## 2023-01-04 PROCEDURE — 83550 IRON BINDING TEST: CPT

## 2023-01-04 PROCEDURE — 82728 ASSAY OF FERRITIN: CPT

## 2023-01-04 PROCEDURE — 83540 ASSAY OF IRON: CPT

## 2023-01-04 PROCEDURE — 82746 ASSAY OF FOLIC ACID SERUM: CPT

## 2023-01-04 PROCEDURE — 71046 X-RAY EXAM CHEST 2 VIEWS: CPT

## 2023-01-04 PROCEDURE — 82607 VITAMIN B-12: CPT

## 2023-01-04 PROCEDURE — 85025 COMPLETE CBC W/AUTO DIFF WBC: CPT

## 2023-02-05 NOTE — PROGRESS NOTES
Patient Name:  Amanda Pedro  Patient :  1940  Patient MRN:  [id number]     Primary Oncologist: Shane Jean-Baptiste MD  Referring Provider: Jose M Freed MD     Date of Service: 2023      Reason for Consult: Anemia   Chief Complaint:    Chief Complaint   Patient presents with    New Patient       Encounter Diagnoses   Name Primary? Iron deficiency anemia, unspecified iron deficiency anemia type Yes    Intestinal malabsorption, unspecified type         HPI:   2023 very pleasant 79-year-old younger looking female arrives with her  to the clinic today. Reported fatigue. Reported pica symptoms. Reported chest discomfort increased shortness of breath on exertion. No palpitations or dizziness. Reported that she has been feeling a little bit nauseated after she has been started on the iron pills. No abdominal pain, emesis. Constipation with iron tablets but recently no constipation or diarrhea. No overt bleeding including GI,  or any other bleeding. She has lost about 4 pounds in the last month secondary to loss of appetite. No  symptoms. Reported low back pain. Looks like she never had any colonoscopy done in the past.  Is taking oral iron once a day. Reported that she was found to have a lump in the left breast, biopsy was performed at [facility] and then 2023 which was negative for any malignancy per her. We will obtain the results. But reported that she has been having pain in the left breast since the biopsy.     2023 chest x-ray with no acute process but bibasilar hypoaeration    [id number] 412 folic acid 98.9 CBC with WBC of 5.6 hemoglobin of 8.8 hematocrit 31.6 MCV 82.1 platelets of 532 diff within normal limits ferritin of 11 iron saturations of 8%    11/3/2022 hemoglobin 9.9 hematocrit 35.2 MCV of 83.8    10/18/2022 bilateral lower extremity Dopplers without any DVT or superficial thromboembolism    Oh 2022 CBC WBC of 7.1 hemoglobin of 12 hematocrit of 40 MCV of 90.9 platelets of 603    EUGENE, indications for A. fib, no evidence of thrombus within the left atrial appendage. Negative bubble study. Moderately dilated left atrium. Normal EF. No pericardial effusion. Patient cardioverted from A. fib to sinus    9/27/2021 mammogram normal BI-RADS Category 2, DEXA scan with osteopenia right femoral neck    6/28/2018 CBC WBC of 5.7 hemoglobin of 14.7 hematocrit 45.8 MCV of 97.2 and platelets of 103 diff within normal limits     Hiatal hernia with gastroesophageal reflux disease without esophagitis  Omeprazole BID for 2 weeks helped nausea/pain from diclofenac. Past Medical History:     A-fib, hypertension, Seasonal allergies, osteopenia. Past Surgery History: Total abdominal hysterectomy, right lumpectomy, left breast biopsy, surgery for right arm fracture                                                                            Social History:   Lives with her . No history of smoking tobacco, alcohol abuse or any other illicit drugs. Family History:    No family history of any leukemia lymphoma or any other blood dyscrasia. Grandmother, mother and sister diagnosed with breast cancer. Allergies   Allergen Reactions    Demerol Hcl [Meperidine] Nausea And Vomiting    Hydrocodone-Acetaminophen Nausea And Vomiting    Lisinopril Cough     cough       Current Outpatient Medications on File Prior to Visit   Medication Sig Dispense Refill    betamethasone dipropionate 0.05 % ointment APPLY TOPICALLY 2 TIMES A DAY AS NEEDED. FOR ITCHING.       fluticasone (FLONASE) 50 MCG/ACT nasal spray 1 spray by Each Nostril route daily      Iron Combinations (IRON COMPLEX PO) Take by mouth      amiodarone (PACERONE) 100 MG tablet Take 100 mg by mouth daily      rOPINIRole (REQUIP) 1 MG tablet Take 1 tablet by mouth nightly 90 tablet 2    omeprazole (PRILOSEC) 40 MG delayed release capsule Take 40 mg by mouth daily      albuterol sulfate HFA (PROVENTIL;VENTOLIN;PROAIR) 108 (90 Base) MCG/ACT inhaler TAKE 2 PUFFS BY MOUTH EVERY 6 HOURS AS NEEDED FOR WHEEZE      montelukast (SINGULAIR) 10 MG tablet TAKE 1 TABLET BY MOUTH EVERY DAY IN THE EVENING      apixaban (ELIQUIS) 5 MG TABS tablet Take 5 mg by mouth 2 times daily      vitamin B-12 (CYANOCOBALAMIN) 500 MCG tablet Take 500 mcg by mouth daily      calcium carbonate (OSCAL) 500 MG TABS tablet Take 500 mg by mouth daily       No current facility-administered medications on file prior to visit. Review of Systems:    Constitutional:  No weight loss, No fever, No chills, No night sweats. Energy level good. Eyes:  No diplopia, No transient or permanent loss of vision, No scotomata. ENT / Mouth:  No epistaxis, No dysphagia, No hoarseness, No oral ulcers, No gingival bleeding. No sore throat, No postnasal drip, No nasal drip, No mouth pain, No sinus pain, No tinnitus, Normal hearing. Cardiovascular:  No chest pain, No palpitations, No syncope, No upper extremity edema, No lower extremity edema, No calf discomfort. Respiratory:  No cough. No hemoptysis, No pleurisy, No wheezing, No dyspnea. Breast:  No breast mass, No pain, No nipple discharge, No change in size, No change in shape. Gastrointestinal:  No abdominal pain, No abdominal cramping, No nausea, No vomiting, No constipation, No diarrhea, No hematochezia, No melena, No jaundice, No dyspepsia, No dysphagia. Urinary:  No dysuria, No hematuria, No urinary incontinence. Gynecological:  No vaginal discharge, No suprapubic pain, No abnormal vaginal bleeding. (Female Patients Only)  Musculoskeletal:  No muscle pain, No swollen joints, No joint redness, No bone pain, No spine tenderness.   Skin:  No rash, No nodules, No pruritus, No lesions. Neurologic:  No confusion, No seizures, No syncope, No tremor, No speech change, No headache, No hiccups, No abnormal gait, No sensory changes, No weakness. Psychiatric:  No depression, No anxiety, Concentration normal.  Endocrine:  No polyuria, No polydipsia, No hot flashes, No thyroid symptoms. Hematologic:  No epistaxis, No gingival bleeding, No petechiae, No ecchymosis. Lymphatic:  No lymphadenopathy, No lymphedema. Allergy / Immunologic:  No eczema, No frequent mucous infections, No frequent respiratory infections, No recurrent urticarial, No frequent skin infections.      Vital Signs: /60 (Site: Right Upper Arm, Position: Sitting, Cuff Size: Medium Adult)   Pulse 80   Temp 97.3 °F (36.3 °C) (Infrared)   Resp 18   Ht 5' 4\" (1.626 m)   Wt 179 lb (81.2 kg)   SpO2 95%   BMI 30.73 kg/m²      CONSTITUTIONAL: awake, alert, cooperative, no apparent distress   EYES: ESTEPHANIA, No pallor or any icterus  ENT: ATNC  NECK: No JVD  HEMATOLOGIC/LYMPHATIC: no cervical, supraclavicular or axillary lymphadenopathy   LUNGS: CTAB  CARDIOVASCULAR: s1s2 rrr no murmurs  ABDOMEN: soft ntnd bs pos  MUSCULOSKELETAL: full range of motion noted, tone is normal  NEUROLOGIC: GI  SKIN: No rash  EXTREMITIES: no LE edema bilaterally     Labs:  Hematology:  Lab Results   Component Value Date    WBC 5.6 01/04/2023    RBC 3.85 (L) 01/04/2023    HGB 8.8 (L) 01/04/2023    HCT 31.6 (L) 01/04/2023    MCV 82.1 01/04/2023    MCH 22.9 (L) 01/04/2023    MCHC 27.8 (L) 01/04/2023    RDW 16.0 (H) 01/04/2023     01/04/2023    MPV 10.2 01/04/2023    SEGSPCT 60.4 01/04/2023    EOSRELPCT 3.5 (H) 01/04/2023    BASOPCT 1.8 (H) 01/04/2023    LYMPHOPCT 26.1 01/04/2023    MONOPCT 7.8 (H) 01/04/2023    SEGSABS 3.4 01/04/2023    EOSABS 0.2 01/04/2023    BASOSABS 0.1 01/04/2023    LYMPHSABS 1.5 01/04/2023    MONOSABS 0.4 01/04/2023    DIFFTYPE AUTOMATED DIFFERENTIAL 01/04/2023     Lab Results   Component Value Date    ESR 12 06/11/2015     Chemistry:  Lab Results   Component Value Date     11/03/2022    K 4.5 11/03/2022     11/03/2022    CO2 27 11/03/2022    BUN 11 11/03/2022    CREATININE 0.8 11/03/2022    GLUCOSE 91 11/03/2022    CALCIUM 9.3 11/03/2022    PROT 5.8 (L) 11/03/2022    LABALBU 3.9 11/03/2022    BILITOT 0.4 11/03/2022    ALKPHOS 127 11/03/2022    AST 22 11/03/2022    ALT 11 11/03/2022    LABGLOM >60 11/03/2022    GFRAA >60 06/20/2022    MG 1.9 06/20/2022     No results found for: MMA, LDH, HOMOCYSTEINE  No components found for: LD  Lab Results   Component Value Date    TSHHS 2.190 06/20/2022    T4FREE 1.09 06/20/2022    FT3 3.0 06/28/2018     Immunology:  Lab Results   Component Value Date    PROT 5.8 (L) 11/03/2022     No results found for: Asuncion Mendoza, KLFLCR  No results found for: B2M  Coagulation Panel:  Lab Results   Component Value Date    PROTIME 13.5 06/20/2022    INR 1.05 06/20/2022    APTT 29.2 06/20/2022     Anemia Panel:  Lab Results   Component Value Date    BHCJZGAM78 944.5 (H) 01/04/2023    FOLATE 16.4 01/04/2023     Tumor Markers:  No results found for: , CEA, , LABCA2, PSA     Observations:  ECOG:  PHQ-9 Total Score: 0 (2/6/2023 10:28 AM)     Assessment & Plan:                                                          Anemia, ferritin and iron indicis suggestive of iron deficiency anemia. Looks like she never had any colonoscopy in the past.  Note that she is on Eliquis for A-fib. We will repeat the iron panel and also ferritin and CBC. Also rule out any blood loss, urine analysis and stool occult ordered. She is on oral iron once a day, recommend that she takes it every other day. Adequate bowel regimen. Recommend parenteral replacement if ferritin too low. Recommend GI evaluation. Note history of osteopenia and compression fractures. Noted plans for physical therapy. Continue calcium with vitamin D. Also recommended weightbearing exercises.     Left breast pain, has follow-up plans    Elizabeth is on amiodarone and Eliquis, looks like rate controlled. Also discussed regarding Watchman procedure if she continues to have iron deficiency/bleeding and no source identified. Continue other medical care. Thank you for letting us be part of the care and will follow along. Discussed above findings and plan with her and she voiced understanding. Answered all her questions. Discussed healthy lifestyle including healthy diet, regular exercise as tolerated. Also discussed importance of being up-to-date with age-appropriate screening tools. Seems like she is up-to-date with mammogram, last one in December 2022 with questionable lump in the left breast, status post biopsy which has been negative. Follow-up screening recommendations. Also discussed the role of colonoscopy. Recommend follow-up with primary care physician and other specialists. Please do not hesitate to contact us if you need further information.     Return to clinic May 2023 or earlier if new Sx      OLAF

## 2023-02-06 ENCOUNTER — INITIAL CONSULT (OUTPATIENT)
Dept: ONCOLOGY | Age: 83
End: 2023-02-06
Payer: MEDICARE

## 2023-02-06 ENCOUNTER — HOSPITAL ENCOUNTER (OUTPATIENT)
Dept: INFUSION THERAPY | Age: 83
Discharge: HOME | End: 2023-02-06
Payer: MEDICARE

## 2023-02-06 VITALS
HEIGHT: 64 IN | HEART RATE: 80 BPM | OXYGEN SATURATION: 95 % | WEIGHT: 179 LBS | SYSTOLIC BLOOD PRESSURE: 136 MMHG | RESPIRATION RATE: 18 BRPM | BODY MASS INDEX: 30.56 KG/M2 | DIASTOLIC BLOOD PRESSURE: 60 MMHG | TEMPERATURE: 97.3 F

## 2023-02-06 DIAGNOSIS — D50.9 IRON DEFICIENCY ANEMIA, UNSPECIFIED IRON DEFICIENCY ANEMIA TYPE: Primary | ICD-10-CM

## 2023-02-06 DIAGNOSIS — K90.9 INTESTINAL MALABSORPTION, UNSPECIFIED TYPE: ICD-10-CM

## 2023-02-06 DIAGNOSIS — D50.9 IRON DEFICIENCY ANEMIA, UNSPECIFIED IRON DEFICIENCY ANEMIA TYPE: ICD-10-CM

## 2023-02-06 LAB
ALBUMIN SERPL-MCNC: 4.1 GM/DL (ref 3.4–5)
ALP BLD-CCNC: 81 IU/L (ref 40–128)
ALT SERPL-CCNC: 10 U/L (ref 10–40)
ANION GAP SERPL CALCULATED.3IONS-SCNC: 8 MMOL/L (ref 4–16)
AST SERPL-CCNC: 23 IU/L (ref 15–37)
BASOPHILS ABSOLUTE: 0.1 K/CU MM
BASOPHILS RELATIVE PERCENT: 1.7 % (ref 0–1)
BILIRUB SERPL-MCNC: 0.3 MG/DL (ref 0–1)
BUN SERPL-MCNC: 12 MG/DL (ref 6–23)
CALCIUM SERPL-MCNC: 9.6 MG/DL (ref 8.3–10.6)
CHLORIDE BLD-SCNC: 104 MMOL/L (ref 99–110)
CO2: 30 MMOL/L (ref 21–32)
CREAT SERPL-MCNC: 0.9 MG/DL (ref 0.6–1.1)
DIFFERENTIAL TYPE: ABNORMAL
EOSINOPHILS ABSOLUTE: 0.1 K/CU MM
EOSINOPHILS RELATIVE PERCENT: 1.7 % (ref 0–3)
ERYTHROCYTE SEDIMENTATION RATE: 5 MM/HR (ref 0–30)
FERRITIN: 156 NG/ML (ref 15–150)
GFR SERPL CREATININE-BSD FRML MDRD: >60 ML/MIN/1.73M2
GLUCOSE SERPL-MCNC: 92 MG/DL (ref 70–99)
HCT VFR BLD CALC: 34 % (ref 37–47)
HEMOGLOBIN: 9.5 GM/DL (ref 12.5–16)
IRON: 78 UG/DL (ref 37–145)
LACTATE DEHYDROGENASE: 214 IU/L (ref 120–246)
LYMPHOCYTES ABSOLUTE: 1.1 K/CU MM
LYMPHOCYTES RELATIVE PERCENT: 20.4 % (ref 24–44)
MCH RBC QN AUTO: 24 PG (ref 27–31)
MCHC RBC AUTO-ENTMCNC: 27.9 % (ref 32–36)
MCV RBC AUTO: 85.9 FL (ref 78–100)
MONOCYTES ABSOLUTE: 0.4 K/CU MM
MONOCYTES RELATIVE PERCENT: 7 % (ref 0–4)
PCT TRANSFERRIN: 20 % (ref 10–44)
PDW BLD-RTO: 23 % (ref 11.7–14.9)
PLATELET # BLD: 305 K/CU MM (ref 140–440)
PMV BLD AUTO: 9.5 FL (ref 7.5–11.1)
POTASSIUM SERPL-SCNC: 4.5 MMOL/L (ref 3.5–5.1)
RBC # BLD: 3.96 M/CU MM (ref 4.2–5.4)
RETICULOCYTE COUNT PCT: 3.3 % (ref 0.2–2.2)
SEGMENTED NEUTROPHILS ABSOLUTE COUNT: 3.8 K/CU MM
SEGMENTED NEUTROPHILS RELATIVE PERCENT: 69.2 % (ref 36–66)
SODIUM BLD-SCNC: 142 MMOL/L (ref 135–145)
TOTAL IRON BINDING CAPACITY: 393 UG/DL (ref 250–450)
TOTAL PROTEIN: 5.9 GM/DL (ref 6.4–8.2)
TSH SERPL DL<=0.005 MIU/L-ACNC: 3.06 UIU/ML (ref 0.27–4.2)
UNSATURATED IRON BINDING CAPACITY: 315 UG/DL (ref 110–370)
WBC # BLD: 5.5 K/CU MM (ref 4–10.5)

## 2023-02-06 PROCEDURE — G8417 CALC BMI ABV UP PARAM F/U: HCPCS | Performed by: INTERNAL MEDICINE

## 2023-02-06 PROCEDURE — 36415 COLL VENOUS BLD VENIPUNCTURE: CPT

## 2023-02-06 PROCEDURE — 3075F SYST BP GE 130 - 139MM HG: CPT | Performed by: INTERNAL MEDICINE

## 2023-02-06 PROCEDURE — 1123F ACP DISCUSS/DSCN MKR DOCD: CPT | Performed by: INTERNAL MEDICINE

## 2023-02-06 PROCEDURE — 3078F DIAST BP <80 MM HG: CPT | Performed by: INTERNAL MEDICINE

## 2023-02-06 PROCEDURE — 83540 ASSAY OF IRON: CPT

## 2023-02-06 PROCEDURE — 83615 LACTATE (LD) (LDH) ENZYME: CPT

## 2023-02-06 PROCEDURE — 85025 COMPLETE CBC W/AUTO DIFF WBC: CPT

## 2023-02-06 PROCEDURE — 85045 AUTOMATED RETICULOCYTE COUNT: CPT

## 2023-02-06 PROCEDURE — 84443 ASSAY THYROID STIM HORMONE: CPT

## 2023-02-06 PROCEDURE — 86038 ANTINUCLEAR ANTIBODIES: CPT

## 2023-02-06 PROCEDURE — 82728 ASSAY OF FERRITIN: CPT

## 2023-02-06 PROCEDURE — 80053 COMPREHEN METABOLIC PANEL: CPT

## 2023-02-06 PROCEDURE — 83550 IRON BINDING TEST: CPT

## 2023-02-06 PROCEDURE — 1090F PRES/ABSN URINE INCON ASSESS: CPT | Performed by: INTERNAL MEDICINE

## 2023-02-06 PROCEDURE — 1036F TOBACCO NON-USER: CPT | Performed by: INTERNAL MEDICINE

## 2023-02-06 PROCEDURE — G8484 FLU IMMUNIZE NO ADMIN: HCPCS | Performed by: INTERNAL MEDICINE

## 2023-02-06 PROCEDURE — 84165 PROTEIN E-PHORESIS SERUM: CPT

## 2023-02-06 PROCEDURE — 84155 ASSAY OF PROTEIN SERUM: CPT

## 2023-02-06 PROCEDURE — G8400 PT W/DXA NO RESULTS DOC: HCPCS | Performed by: INTERNAL MEDICINE

## 2023-02-06 PROCEDURE — 86430 RHEUMATOID FACTOR TEST QUAL: CPT

## 2023-02-06 PROCEDURE — 99211 OFF/OP EST MAY X REQ PHY/QHP: CPT

## 2023-02-06 PROCEDURE — 85652 RBC SED RATE AUTOMATED: CPT

## 2023-02-06 PROCEDURE — G8427 DOCREV CUR MEDS BY ELIG CLIN: HCPCS | Performed by: INTERNAL MEDICINE

## 2023-02-06 PROCEDURE — 83010 ASSAY OF HAPTOGLOBIN QUANT: CPT

## 2023-02-06 PROCEDURE — 99204 OFFICE O/P NEW MOD 45 MIN: CPT | Performed by: INTERNAL MEDICINE

## 2023-02-06 RX ORDER — FLUTICASONE PROPIONATE 50 MCG
1 SPRAY, SUSPENSION (ML) NASAL DAILY
COMMUNITY

## 2023-02-06 RX ORDER — BETAMETHASONE DIPROPIONATE 0.05 %
OINTMENT (GRAM) TOPICAL
COMMUNITY
Start: 2023-01-08

## 2023-02-06 ASSESSMENT — PATIENT HEALTH QUESTIONNAIRE - PHQ9
2. FEELING DOWN, DEPRESSED OR HOPELESS: 0
SUM OF ALL RESPONSES TO PHQ QUESTIONS 1-9: 0
SUM OF ALL RESPONSES TO PHQ QUESTIONS 1-9: 0
1. LITTLE INTEREST OR PLEASURE IN DOING THINGS: 0
SUM OF ALL RESPONSES TO PHQ QUESTIONS 1-9: 0
SUM OF ALL RESPONSES TO PHQ QUESTIONS 1-9: 0
SUM OF ALL RESPONSES TO PHQ9 QUESTIONS 1 & 2: 0

## 2023-02-06 NOTE — PROGRESS NOTES
MA Rooming Questions  Patient: Paul Bobby  MRN: [id number]    Date: 2/6/2023        NP    5. Did the patient have a depression screening completed today?  Yes    No data recorded     PHQ-9 Given to (if applicable):               PHQ-9 Score (if applicable):                     [] Positive     [x]  Negative              Does question #9 need addressed (if applicable)                     [] Yes    []  No               Lio Orr MA

## 2023-02-07 LAB
ALBUMIN SERPL ELPH-MCNC: 3.4 GM/DL (ref 3.2–5.6)
ALPHA-1-GLOBULIN: 0.3 GM/DL (ref 0.1–0.4)
ALPHA-2-GLOBULIN: 0.7 GM/DL (ref 0.4–1.2)
BETA GLOBULIN: 1 GM/DL (ref 0.5–1.3)
GAMMA GLOBULIN: 0.5 GM/DL (ref 0.5–1.6)
SPEP INTERPRETATION: ABNORMAL
TOTAL PROTEIN: 5.9 GM/DL (ref 6.4–8.2)

## 2023-02-08 LAB
HAPTOGLOB SERPL-MCNC: 89 MG/DL (ref 30–200)
RHEUMATOID FACTOR: <10 IU/ML (ref 0–14)

## 2023-02-09 ENCOUNTER — HOSPITAL ENCOUNTER (OUTPATIENT)
Age: 83
Setting detail: SPECIMEN
Discharge: HOME | End: 2023-02-09
Payer: MEDICARE

## 2023-02-09 LAB
BACTERIA: NEGATIVE /HPF
BILIRUBIN URINE: NEGATIVE MG/DL
BLOOD, URINE: NEGATIVE
CLARITY: ABNORMAL
COLOR: YELLOW
GLUCOSE, URINE: NEGATIVE MG/DL
KETONES, URINE: NEGATIVE MG/DL
LEUKOCYTE ESTERASE, URINE: NEGATIVE
MUCUS: ABNORMAL HPF
NITRITE URINE, QUANTITATIVE: NEGATIVE
NUCLEAR IGG SER QL IA: NORMAL
PH, URINE: 5.5 (ref 5–8)
PROTEIN UA: NEGATIVE MG/DL
RBC URINE: <1 /HPF (ref 0–6)
SPECIFIC GRAVITY UA: 1.02 (ref 1–1.03)
SQUAMOUS EPITHELIAL: 3 /HPF
TRANSITIONAL EPITHELIAL: <1 /HPF
TRICHOMONAS: ABNORMAL /HPF
UROBILINOGEN, URINE: 0.2 MG/DL (ref 0.2–1)
WBC UA: <1 /HPF (ref 0–5)

## 2023-02-09 PROCEDURE — 81001 URINALYSIS AUTO W/SCOPE: CPT

## 2023-03-21 ENCOUNTER — HOSPITAL ENCOUNTER (OUTPATIENT)
Age: 83
Discharge: HOME | End: 2023-03-21
Payer: MEDICARE

## 2023-03-21 LAB
ALBUMIN SERPL-MCNC: 3.9 GM/DL (ref 3.4–5)
ALP BLD-CCNC: 72 IU/L (ref 40–128)
ALT SERPL-CCNC: 17 U/L (ref 10–40)
ANION GAP SERPL CALCULATED.3IONS-SCNC: 10 MMOL/L (ref 4–16)
AST SERPL-CCNC: 34 IU/L (ref 15–37)
BILIRUB SERPL-MCNC: 0.4 MG/DL (ref 0–1)
BUN SERPL-MCNC: 11 MG/DL (ref 6–23)
CALCIUM SERPL-MCNC: 9.4 MG/DL (ref 8.3–10.6)
CHLORIDE BLD-SCNC: 103 MMOL/L (ref 99–110)
CHOLEST SERPL-MCNC: 189 MG/DL
CO2: 28 MMOL/L (ref 21–32)
CREAT SERPL-MCNC: 0.9 MG/DL (ref 0.6–1.1)
ESTIMATED AVERAGE GLUCOSE: 94 MG/DL
GFR SERPL CREATININE-BSD FRML MDRD: >60 ML/MIN/1.73M2
GLUCOSE SERPL-MCNC: 94 MG/DL (ref 70–99)
HBA1C MFR BLD: 4.9 % (ref 4.2–6.3)
HDLC SERPL-MCNC: 51 MG/DL
LDLC SERPL CALC-MCNC: 104 MG/DL
POTASSIUM SERPL-SCNC: 4.8 MMOL/L (ref 3.5–5.1)
SODIUM BLD-SCNC: 141 MMOL/L (ref 135–145)
TOTAL PROTEIN: 5.7 GM/DL (ref 6.4–8.2)
TRIGL SERPL-MCNC: 171 MG/DL
URATE SERPL-MCNC: 4 MG/DL (ref 2.6–6)

## 2023-03-21 PROCEDURE — 84550 ASSAY OF BLOOD/URIC ACID: CPT

## 2023-03-21 PROCEDURE — 80053 COMPREHEN METABOLIC PANEL: CPT

## 2023-03-21 PROCEDURE — 80061 LIPID PANEL: CPT

## 2023-03-21 PROCEDURE — 36415 COLL VENOUS BLD VENIPUNCTURE: CPT

## 2023-03-21 PROCEDURE — 83036 HEMOGLOBIN GLYCOSYLATED A1C: CPT

## 2023-05-08 ENCOUNTER — HOSPITAL ENCOUNTER (OUTPATIENT)
Dept: INFUSION THERAPY | Age: 83
Discharge: HOME | End: 2023-05-08
Payer: MEDICARE

## 2023-05-08 DIAGNOSIS — K90.9 INTESTINAL MALABSORPTION, UNSPECIFIED TYPE: Primary | ICD-10-CM

## 2023-05-08 DIAGNOSIS — K90.9 INTESTINAL MALABSORPTION, UNSPECIFIED TYPE: ICD-10-CM

## 2023-05-08 LAB
ALBUMIN SERPL-MCNC: 3.9 GM/DL (ref 3.4–5)
ALP BLD-CCNC: 76 IU/L (ref 40–129)
ALT SERPL-CCNC: 13 U/L (ref 10–40)
ANION GAP SERPL CALCULATED.3IONS-SCNC: 8 MMOL/L (ref 4–16)
AST SERPL-CCNC: 24 IU/L (ref 15–37)
BASOPHILS ABSOLUTE: 0 K/CU MM
BASOPHILS RELATIVE PERCENT: 0.6 % (ref 0–1)
BILIRUB SERPL-MCNC: 0.2 MG/DL (ref 0–1)
BUN SERPL-MCNC: 13 MG/DL (ref 6–23)
CALCIUM SERPL-MCNC: 9.3 MG/DL (ref 8.3–10.6)
CHLORIDE BLD-SCNC: 104 MMOL/L (ref 99–110)
CO2: 29 MMOL/L (ref 21–32)
CREAT SERPL-MCNC: 0.8 MG/DL (ref 0.6–1.1)
DIFFERENTIAL TYPE: ABNORMAL
EOSINOPHILS ABSOLUTE: 0.1 K/CU MM
EOSINOPHILS RELATIVE PERCENT: 2.1 % (ref 0–3)
ERYTHROCYTE SEDIMENTATION RATE: 4 MM/HR (ref 0–30)
FERRITIN: 73 NG/ML (ref 15–150)
FOLATE SERPL-MCNC: 16.5 NG/ML (ref 3.1–17.5)
GFR SERPL CREATININE-BSD FRML MDRD: >60 ML/MIN/1.73M2
GLUCOSE SERPL-MCNC: 141 MG/DL (ref 70–99)
HCT VFR BLD CALC: 38.2 % (ref 37–47)
HEMOGLOBIN: 12.2 GM/DL (ref 12.5–16)
IRON: 44 UG/DL (ref 37–145)
LACTATE DEHYDROGENASE: 200 IU/L (ref 120–246)
LYMPHOCYTES ABSOLUTE: 1.3 K/CU MM
LYMPHOCYTES RELATIVE PERCENT: 24.6 % (ref 24–44)
MCH RBC QN AUTO: 31 PG (ref 27–31)
MCHC RBC AUTO-ENTMCNC: 31.9 % (ref 32–36)
MCV RBC AUTO: 97.2 FL (ref 78–100)
MONOCYTES ABSOLUTE: 0.4 K/CU MM
MONOCYTES RELATIVE PERCENT: 7.2 % (ref 0–4)
PCT TRANSFERRIN: 14 % (ref 10–44)
PDW BLD-RTO: 16 % (ref 11.7–14.9)
PLATELET # BLD: 213 K/CU MM (ref 140–440)
PMV BLD AUTO: 9.9 FL (ref 7.5–11.1)
POTASSIUM SERPL-SCNC: 4.3 MMOL/L (ref 3.5–5.1)
RBC # BLD: 3.93 M/CU MM (ref 4.2–5.4)
RETICULOCYTE COUNT PCT: 2.9 % (ref 0.2–2.2)
SEGMENTED NEUTROPHILS ABSOLUTE COUNT: 3.4 K/CU MM
SEGMENTED NEUTROPHILS RELATIVE PERCENT: 65.5 % (ref 36–66)
SODIUM BLD-SCNC: 141 MMOL/L (ref 135–145)
TOTAL IRON BINDING CAPACITY: 305 UG/DL (ref 250–450)
TOTAL PROTEIN: 5.8 GM/DL (ref 6.4–8.2)
UNSATURATED IRON BINDING CAPACITY: 261 UG/DL (ref 110–370)
VITAMIN B-12: 1058 PG/ML (ref 211–911)
WBC # BLD: 5.1 K/CU MM (ref 4–10.5)

## 2023-05-08 PROCEDURE — 83540 ASSAY OF IRON: CPT

## 2023-05-08 PROCEDURE — 36415 COLL VENOUS BLD VENIPUNCTURE: CPT

## 2023-05-08 PROCEDURE — 85025 COMPLETE CBC W/AUTO DIFF WBC: CPT

## 2023-05-08 PROCEDURE — 82728 ASSAY OF FERRITIN: CPT

## 2023-05-08 PROCEDURE — 85045 AUTOMATED RETICULOCYTE COUNT: CPT

## 2023-05-08 PROCEDURE — 80053 COMPREHEN METABOLIC PANEL: CPT

## 2023-05-08 PROCEDURE — 82746 ASSAY OF FOLIC ACID SERUM: CPT

## 2023-05-08 PROCEDURE — 82607 VITAMIN B-12: CPT

## 2023-05-08 PROCEDURE — 83550 IRON BINDING TEST: CPT

## 2023-05-08 PROCEDURE — 84155 ASSAY OF PROTEIN SERUM: CPT

## 2023-05-08 PROCEDURE — 84165 PROTEIN E-PHORESIS SERUM: CPT

## 2023-05-08 PROCEDURE — 85652 RBC SED RATE AUTOMATED: CPT

## 2023-05-08 PROCEDURE — 83615 LACTATE (LD) (LDH) ENZYME: CPT

## 2023-05-10 LAB
ALBUMIN SERPL ELPH-MCNC: 3.5 GM/DL (ref 3.2–5.6)
ALPHA-1-GLOBULIN: 0.2 GM/DL (ref 0.1–0.4)
ALPHA-2-GLOBULIN: 0.7 GM/DL (ref 0.4–1.2)
BETA GLOBULIN: 1 GM/DL (ref 0.5–1.3)
GAMMA GLOBULIN: 0.4 GM/DL (ref 0.5–1.6)
SPEP INTERPRETATION: ABNORMAL
TOTAL PROTEIN: 5.8 GM/DL (ref 6.4–8.2)

## 2023-05-10 NOTE — PROGRESS NOTES
Patient Name:  Milton Duenas  Patient :  1940  Patient MRN:  [id number]     Primary Oncologist: Roger Hammer MD  Referring Provider: Ilya Martin MD     Date of Service: 2023      Reason for Consult: Anemia   Chief Complaint:    Chief Complaint   Patient presents with    Follow-up       Encounter Diagnoses   Name Primary? Iron deficiency anemia, unspecified iron deficiency anemia type Yes    Intestinal malabsorption, unspecified type         HPI:   2023 very pleasant 23-year-old younger looking female arrives with her  to the clinic today. Reported fatigue. Reported pica symptoms. Reported chest discomfort increased shortness of breath on exertion. No palpitations or dizziness. Reported that she has been feeling a little bit nauseated after she has been started on the iron pills. No abdominal pain, emesis. Constipation with iron tablets but recently no constipation or diarrhea. No overt bleeding including GI,  or any other bleeding. She has lost about 4 pounds in the last month secondary to loss of appetite. No  symptoms. Reported low back pain. Looks like she never had any colonoscopy done in the past.  Is taking oral iron once a day. Reported that she was found to have a lump in the left breast, biopsy was performed at [facility] and then 2023 which was negative for any malignancy per her. We will obtain the results. But reported that she has been having pain in the left breast since the biopsy.     2023 chest x-ray with no acute process but bibasilar hypoaeration     Q95 794 folic acid 48.1 CBC with WBC of 5.6 hemoglobin of 8.8 hematocrit 31.6 MCV 82.1 platelets of 615 diff within normal limits ferritin of 11 iron saturations of 8%    11/3/2022 hemoglobin 9.9 hematocrit 35.2 MCV of 83.8    10/18/2022 bilateral lower extremity Dopplers without any DVT or superficial thromboembolism    Oh 2022 CBC WBC of 7.1 hemoglobin of 12

## 2023-05-12 ENCOUNTER — OFFICE VISIT (OUTPATIENT)
Dept: ONCOLOGY | Age: 83
End: 2023-05-12
Payer: MEDICARE

## 2023-05-12 ENCOUNTER — HOSPITAL ENCOUNTER (OUTPATIENT)
Dept: INFUSION THERAPY | Age: 83
Discharge: HOME | End: 2023-05-12
Payer: MEDICARE

## 2023-05-12 VITALS
HEART RATE: 70 BPM | OXYGEN SATURATION: 94 % | SYSTOLIC BLOOD PRESSURE: 144 MMHG | BODY MASS INDEX: 29.37 KG/M2 | DIASTOLIC BLOOD PRESSURE: 67 MMHG | HEIGHT: 64 IN | WEIGHT: 172 LBS | TEMPERATURE: 97.5 F

## 2023-05-12 DIAGNOSIS — K90.9 INTESTINAL MALABSORPTION, UNSPECIFIED TYPE: ICD-10-CM

## 2023-05-12 DIAGNOSIS — D50.9 IRON DEFICIENCY ANEMIA, UNSPECIFIED IRON DEFICIENCY ANEMIA TYPE: Primary | ICD-10-CM

## 2023-05-12 PROCEDURE — 99214 OFFICE O/P EST MOD 30 MIN: CPT | Performed by: INTERNAL MEDICINE

## 2023-05-12 PROCEDURE — 3077F SYST BP >= 140 MM HG: CPT | Performed by: INTERNAL MEDICINE

## 2023-05-12 PROCEDURE — G8400 PT W/DXA NO RESULTS DOC: HCPCS | Performed by: INTERNAL MEDICINE

## 2023-05-12 PROCEDURE — 1036F TOBACCO NON-USER: CPT | Performed by: INTERNAL MEDICINE

## 2023-05-12 PROCEDURE — G8427 DOCREV CUR MEDS BY ELIG CLIN: HCPCS | Performed by: INTERNAL MEDICINE

## 2023-05-12 PROCEDURE — 1123F ACP DISCUSS/DSCN MKR DOCD: CPT | Performed by: INTERNAL MEDICINE

## 2023-05-12 PROCEDURE — 99211 OFF/OP EST MAY X REQ PHY/QHP: CPT

## 2023-05-12 PROCEDURE — 1090F PRES/ABSN URINE INCON ASSESS: CPT | Performed by: INTERNAL MEDICINE

## 2023-05-12 PROCEDURE — G8417 CALC BMI ABV UP PARAM F/U: HCPCS | Performed by: INTERNAL MEDICINE

## 2023-05-12 PROCEDURE — 3078F DIAST BP <80 MM HG: CPT | Performed by: INTERNAL MEDICINE

## 2023-05-12 RX ORDER — METOPROLOL SUCCINATE 25 MG/1
25 TABLET, EXTENDED RELEASE ORAL DAILY
COMMUNITY

## 2023-05-12 RX ORDER — AMIODARONE HYDROCHLORIDE 100 MG/1
100 TABLET ORAL DAILY
COMMUNITY

## 2023-05-12 ASSESSMENT — PATIENT HEALTH QUESTIONNAIRE - PHQ9
SUM OF ALL RESPONSES TO PHQ QUESTIONS 1-9: 0
SUM OF ALL RESPONSES TO PHQ QUESTIONS 1-9: 0
SUM OF ALL RESPONSES TO PHQ9 QUESTIONS 1 & 2: 0
SUM OF ALL RESPONSES TO PHQ QUESTIONS 1-9: 0
SUM OF ALL RESPONSES TO PHQ QUESTIONS 1-9: 0
1. LITTLE INTEREST OR PLEASURE IN DOING THINGS: 0
2. FEELING DOWN, DEPRESSED OR HOPELESS: 0

## 2023-05-12 NOTE — PROGRESS NOTES
MA Rooming Questions  Patient: Diana Ellis  MRN: [id number]    Date: 5/12/2023        1. Do you have any new issues?   no         2. Do you need any refills on medications?    no    3. Have you had any imaging done since your last visit?   no    4. Have you been hospitalized or seen in the emergency room since your last visit here?   no    5. Did the patient have a depression screening completed today?  Yes    PHQ-9 Total Score: 0 (5/12/2023 10:27 AM)       PHQ-9 Given to (if applicable):               PHQ-9 Score (if applicable):                     [] Positive     [x]  Negative              Does question #9 need addressed (if applicable)                     [] Yes    []  No               Claude Sigala CMA

## 2023-08-14 ENCOUNTER — HOSPITAL ENCOUNTER (OUTPATIENT)
Age: 83
Discharge: HOME | End: 2023-08-14
Payer: MEDICARE

## 2023-08-14 LAB
ALBUMIN SERPL-MCNC: 4.3 GM/DL (ref 3.4–5)
ALP BLD-CCNC: 77 IU/L (ref 40–129)
ALT SERPL-CCNC: 18 U/L (ref 10–40)
ANION GAP SERPL CALCULATED.3IONS-SCNC: 9 MMOL/L (ref 4–16)
AST SERPL-CCNC: 30 IU/L (ref 15–37)
BILIRUB SERPL-MCNC: 0.5 MG/DL (ref 0–1)
BUN SERPL-MCNC: 13 MG/DL (ref 6–23)
CALCIUM SERPL-MCNC: 9.6 MG/DL (ref 8.3–10.6)
CHLORIDE BLD-SCNC: 103 MMOL/L (ref 99–110)
CHOLEST SERPL-MCNC: 205 MG/DL
CO2: 30 MMOL/L (ref 21–32)
CREAT SERPL-MCNC: 0.9 MG/DL (ref 0.6–1.1)
ESTIMATED AVERAGE GLUCOSE: 103 MG/DL
FOLATE SERPL-MCNC: 17.1 NG/ML (ref 3.1–17.5)
GFR SERPL CREATININE-BSD FRML MDRD: >60 ML/MIN/1.73M2
GLUCOSE SERPL-MCNC: 94 MG/DL (ref 70–99)
HBA1C MFR BLD: 5.2 % (ref 4.2–6.3)
HDLC SERPL-MCNC: 53 MG/DL
LDLC SERPL CALC-MCNC: 112 MG/DL
POTASSIUM SERPL-SCNC: 4.9 MMOL/L (ref 3.5–5.1)
SODIUM BLD-SCNC: 142 MMOL/L (ref 135–145)
T4 FREE SERPL-MCNC: 1.39 NG/DL (ref 0.9–1.8)
TOTAL PROTEIN: 5.9 GM/DL (ref 6.4–8.2)
TRIGL SERPL-MCNC: 201 MG/DL
TSH SERPL DL<=0.005 MIU/L-ACNC: 3.39 UIU/ML (ref 0.27–4.2)
URATE SERPL-MCNC: 3.8 MG/DL (ref 2.6–6)
VITAMIN B-12: 1236 PG/ML (ref 211–911)

## 2023-08-14 PROCEDURE — 82746 ASSAY OF FOLIC ACID SERUM: CPT

## 2023-08-14 PROCEDURE — 84439 ASSAY OF FREE THYROXINE: CPT

## 2023-08-14 PROCEDURE — 80061 LIPID PANEL: CPT

## 2023-08-14 PROCEDURE — 80053 COMPREHEN METABOLIC PANEL: CPT

## 2023-08-14 PROCEDURE — 84550 ASSAY OF BLOOD/URIC ACID: CPT

## 2023-08-14 PROCEDURE — 83036 HEMOGLOBIN GLYCOSYLATED A1C: CPT

## 2023-08-14 PROCEDURE — 82607 VITAMIN B-12: CPT

## 2023-08-14 PROCEDURE — 36415 COLL VENOUS BLD VENIPUNCTURE: CPT

## 2023-08-14 PROCEDURE — 84443 ASSAY THYROID STIM HORMONE: CPT

## 2023-08-31 ENCOUNTER — HOSPITAL ENCOUNTER (OUTPATIENT)
Dept: INFUSION THERAPY | Age: 83
Discharge: HOME | End: 2023-08-31
Payer: MEDICARE

## 2023-08-31 DIAGNOSIS — D50.9 IRON DEFICIENCY ANEMIA, UNSPECIFIED IRON DEFICIENCY ANEMIA TYPE: ICD-10-CM

## 2023-08-31 DIAGNOSIS — K90.9 INTESTINAL MALABSORPTION, UNSPECIFIED TYPE: ICD-10-CM

## 2023-08-31 LAB
ALBUMIN SERPL-MCNC: 4.2 GM/DL (ref 3.4–5)
ALP BLD-CCNC: 75 IU/L (ref 40–129)
ALT SERPL-CCNC: 16 U/L (ref 10–40)
ANION GAP SERPL CALCULATED.3IONS-SCNC: 9 MMOL/L (ref 4–16)
AST SERPL-CCNC: 32 IU/L (ref 15–37)
BASOPHILS ABSOLUTE: 0 K/CU MM
BASOPHILS RELATIVE PERCENT: 0.6 % (ref 0–1)
BILIRUB SERPL-MCNC: 0.3 MG/DL (ref 0–1)
BUN SERPL-MCNC: 11 MG/DL (ref 6–23)
CALCIUM SERPL-MCNC: 9.2 MG/DL (ref 8.3–10.6)
CHLORIDE BLD-SCNC: 104 MMOL/L (ref 99–110)
CO2: 29 MMOL/L (ref 21–32)
CREAT SERPL-MCNC: 0.9 MG/DL (ref 0.6–1.1)
DIFFERENTIAL TYPE: ABNORMAL
EOSINOPHILS ABSOLUTE: 0.1 K/CU MM
EOSINOPHILS RELATIVE PERCENT: 2.2 % (ref 0–3)
FERRITIN: 61 NG/ML (ref 15–150)
FOLATE SERPL-MCNC: >20 NG/ML (ref 3.1–17.5)
GFR SERPL CREATININE-BSD FRML MDRD: >60 ML/MIN/1.73M2
GLUCOSE SERPL-MCNC: 105 MG/DL (ref 70–99)
HCT VFR BLD CALC: 38.9 % (ref 37–47)
HEMOGLOBIN: 12.2 GM/DL (ref 12.5–16)
IRON: 48 UG/DL (ref 37–145)
LYMPHOCYTES ABSOLUTE: 1.4 K/CU MM
LYMPHOCYTES RELATIVE PERCENT: 28 % (ref 24–44)
MCH RBC QN AUTO: 31.4 PG (ref 27–31)
MCHC RBC AUTO-ENTMCNC: 31.4 % (ref 32–36)
MCV RBC AUTO: 100 FL (ref 78–100)
MONOCYTES ABSOLUTE: 0.5 K/CU MM
MONOCYTES RELATIVE PERCENT: 9.4 % (ref 0–4)
PCT TRANSFERRIN: 15 % (ref 10–44)
PDW BLD-RTO: 13.2 % (ref 11.7–14.9)
PLATELET # BLD: 206 K/CU MM (ref 140–440)
PMV BLD AUTO: 10.4 FL (ref 7.5–11.1)
POTASSIUM SERPL-SCNC: 4.8 MMOL/L (ref 3.5–5.1)
RBC # BLD: 3.89 M/CU MM (ref 4.2–5.4)
SEGMENTED NEUTROPHILS ABSOLUTE COUNT: 3 K/CU MM
SEGMENTED NEUTROPHILS RELATIVE PERCENT: 59.8 % (ref 36–66)
SODIUM BLD-SCNC: 142 MMOL/L (ref 135–145)
TOTAL IRON BINDING CAPACITY: 323 UG/DL (ref 250–450)
TOTAL PROTEIN: 5.6 GM/DL (ref 6.4–8.2)
UNSATURATED IRON BINDING CAPACITY: 275 UG/DL (ref 110–370)
VITAMIN B-12: 1015 PG/ML (ref 211–911)
WBC # BLD: 5 K/CU MM (ref 4–10.5)

## 2023-08-31 PROCEDURE — 82746 ASSAY OF FOLIC ACID SERUM: CPT

## 2023-08-31 PROCEDURE — 80053 COMPREHEN METABOLIC PANEL: CPT

## 2023-08-31 PROCEDURE — 82607 VITAMIN B-12: CPT

## 2023-08-31 PROCEDURE — 83550 IRON BINDING TEST: CPT

## 2023-08-31 PROCEDURE — 85025 COMPLETE CBC W/AUTO DIFF WBC: CPT

## 2023-08-31 PROCEDURE — 36415 COLL VENOUS BLD VENIPUNCTURE: CPT

## 2023-08-31 PROCEDURE — 83540 ASSAY OF IRON: CPT

## 2023-08-31 PROCEDURE — 82728 ASSAY OF FERRITIN: CPT
